# Patient Record
Sex: MALE | Race: ASIAN | NOT HISPANIC OR LATINO | URBAN - METROPOLITAN AREA
[De-identification: names, ages, dates, MRNs, and addresses within clinical notes are randomized per-mention and may not be internally consistent; named-entity substitution may affect disease eponyms.]

---

## 2017-11-25 ENCOUNTER — EMERGENCY (EMERGENCY)
Facility: HOSPITAL | Age: 27
LOS: 1 days | Discharge: HOME | End: 2017-11-25
Attending: EMERGENCY MEDICINE | Admitting: EMERGENCY MEDICINE
Payer: SELF-PAY

## 2017-11-25 VITALS
HEART RATE: 99 BPM | TEMPERATURE: 101 F | RESPIRATION RATE: 22 BRPM | OXYGEN SATURATION: 97 % | SYSTOLIC BLOOD PRESSURE: 99 MMHG | DIASTOLIC BLOOD PRESSURE: 65 MMHG

## 2017-11-25 VITALS
DIASTOLIC BLOOD PRESSURE: 67 MMHG | SYSTOLIC BLOOD PRESSURE: 114 MMHG | TEMPERATURE: 99 F | OXYGEN SATURATION: 99 % | RESPIRATION RATE: 18 BRPM | HEART RATE: 81 BPM

## 2017-11-25 LAB
ALBUMIN SERPL ELPH-MCNC: 4.5 G/DL — SIGNIFICANT CHANGE UP (ref 3.3–5)
ALP SERPL-CCNC: 93 U/L — SIGNIFICANT CHANGE UP (ref 40–120)
ALT FLD-CCNC: 30 U/L RC — SIGNIFICANT CHANGE UP (ref 10–45)
ANION GAP SERPL CALC-SCNC: 13 MMOL/L — SIGNIFICANT CHANGE UP (ref 5–17)
AST SERPL-CCNC: 26 U/L — SIGNIFICANT CHANGE UP (ref 10–40)
BASOPHILS # BLD AUTO: 0 K/UL — SIGNIFICANT CHANGE UP (ref 0–0.2)
BASOPHILS NFR BLD AUTO: 0.3 % — SIGNIFICANT CHANGE UP (ref 0–2)
BILIRUB SERPL-MCNC: 0.7 MG/DL — SIGNIFICANT CHANGE UP (ref 0.2–1.2)
BUN SERPL-MCNC: 9 MG/DL — SIGNIFICANT CHANGE UP (ref 7–23)
CALCIUM SERPL-MCNC: 9.4 MG/DL — SIGNIFICANT CHANGE UP (ref 8.4–10.5)
CHLORIDE SERPL-SCNC: 99 MMOL/L — SIGNIFICANT CHANGE UP (ref 96–108)
CO2 SERPL-SCNC: 26 MMOL/L — SIGNIFICANT CHANGE UP (ref 22–31)
CREAT SERPL-MCNC: 1.03 MG/DL — SIGNIFICANT CHANGE UP (ref 0.5–1.3)
EOSINOPHIL # BLD AUTO: 0 K/UL — SIGNIFICANT CHANGE UP (ref 0–0.5)
EOSINOPHIL NFR BLD AUTO: 0.4 % — SIGNIFICANT CHANGE UP (ref 0–6)
GLUCOSE SERPL-MCNC: 103 MG/DL — HIGH (ref 70–99)
HCT VFR BLD CALC: 48.3 % — SIGNIFICANT CHANGE UP (ref 39–50)
HETEROPH AB TITR SER AGGL: NEGATIVE — SIGNIFICANT CHANGE UP
HGB BLD-MCNC: 16.2 G/DL — SIGNIFICANT CHANGE UP (ref 13–17)
LACTATE BLDV-MCNC: 0.9 MMOL/L — SIGNIFICANT CHANGE UP (ref 0.7–2)
LYMPHOCYTES # BLD AUTO: 1.6 K/UL — SIGNIFICANT CHANGE UP (ref 1–3.3)
LYMPHOCYTES # BLD AUTO: 16.4 % — SIGNIFICANT CHANGE UP (ref 13–44)
MCHC RBC-ENTMCNC: 30.9 PG — SIGNIFICANT CHANGE UP (ref 27–34)
MCHC RBC-ENTMCNC: 33.6 GM/DL — SIGNIFICANT CHANGE UP (ref 32–36)
MCV RBC AUTO: 92 FL — SIGNIFICANT CHANGE UP (ref 80–100)
MONOCYTES # BLD AUTO: 1.1 K/UL — HIGH (ref 0–0.9)
MONOCYTES NFR BLD AUTO: 10.9 % — SIGNIFICANT CHANGE UP (ref 2–14)
NEUTROPHILS # BLD AUTO: 7 K/UL — SIGNIFICANT CHANGE UP (ref 1.8–7.4)
NEUTROPHILS NFR BLD AUTO: 72.2 % — SIGNIFICANT CHANGE UP (ref 43–77)
PLATELET # BLD AUTO: 164 K/UL — SIGNIFICANT CHANGE UP (ref 150–400)
POTASSIUM SERPL-MCNC: 3.9 MMOL/L — SIGNIFICANT CHANGE UP (ref 3.5–5.3)
POTASSIUM SERPL-SCNC: 3.9 MMOL/L — SIGNIFICANT CHANGE UP (ref 3.5–5.3)
PROT SERPL-MCNC: 8.4 G/DL — HIGH (ref 6–8.3)
RBC # BLD: 5.25 M/UL — SIGNIFICANT CHANGE UP (ref 4.2–5.8)
RBC # FLD: 11.3 % — SIGNIFICANT CHANGE UP (ref 10.3–14.5)
S PYO AG SPEC QL IA: NEGATIVE — SIGNIFICANT CHANGE UP
SODIUM SERPL-SCNC: 138 MMOL/L — SIGNIFICANT CHANGE UP (ref 135–145)
WBC # BLD: 9.8 K/UL — SIGNIFICANT CHANGE UP (ref 3.8–10.5)
WBC # FLD AUTO: 9.8 K/UL — SIGNIFICANT CHANGE UP (ref 3.8–10.5)

## 2017-11-25 PROCEDURE — 85027 COMPLETE CBC AUTOMATED: CPT

## 2017-11-25 PROCEDURE — 87880 STREP A ASSAY W/OPTIC: CPT

## 2017-11-25 PROCEDURE — 99284 EMERGENCY DEPT VISIT MOD MDM: CPT

## 2017-11-25 PROCEDURE — 87081 CULTURE SCREEN ONLY: CPT

## 2017-11-25 PROCEDURE — 80053 COMPREHEN METABOLIC PANEL: CPT

## 2017-11-25 PROCEDURE — 86308 HETEROPHILE ANTIBODY SCREEN: CPT

## 2017-11-25 PROCEDURE — 83605 ASSAY OF LACTIC ACID: CPT

## 2017-11-25 RX ORDER — ACETAMINOPHEN 500 MG
975 TABLET ORAL ONCE
Qty: 0 | Refills: 0 | Status: COMPLETED | OUTPATIENT
Start: 2017-11-25 | End: 2017-11-25

## 2017-11-25 RX ORDER — ACETAMINOPHEN 500 MG
1000 TABLET ORAL ONCE
Qty: 0 | Refills: 0 | Status: DISCONTINUED | OUTPATIENT
Start: 2017-11-25 | End: 2017-11-25

## 2017-11-25 RX ORDER — IBUPROFEN 200 MG
600 TABLET ORAL ONCE
Qty: 0 | Refills: 0 | Status: COMPLETED | OUTPATIENT
Start: 2017-11-25 | End: 2017-11-25

## 2017-11-25 RX ORDER — SODIUM CHLORIDE 9 MG/ML
1000 INJECTION INTRAMUSCULAR; INTRAVENOUS; SUBCUTANEOUS ONCE
Qty: 0 | Refills: 0 | Status: COMPLETED | OUTPATIENT
Start: 2017-11-25 | End: 2017-11-25

## 2017-11-25 RX ADMIN — Medication 600 MILLIGRAM(S): at 14:07

## 2017-11-25 RX ADMIN — Medication 975 MILLIGRAM(S): at 12:03

## 2017-11-25 RX ADMIN — SODIUM CHLORIDE 2000 MILLILITER(S): 9 INJECTION INTRAMUSCULAR; INTRAVENOUS; SUBCUTANEOUS at 11:59

## 2017-11-25 RX ADMIN — Medication 600 MILLIGRAM(S): at 12:03

## 2017-11-25 NOTE — ED PROVIDER NOTE - PLAN OF CARE
1. Follow up with your PMD within 48-72 hours.  Or call medicine clinic [phone] to make an appointment. You will get a call for any abnormal results   2. Rest, increase fluids. Take tylenol 1g every 6 hours for pain or fevers alternated with Motrin 600mg every 6 hours. Keep self hydration.   3. Worsening or continued fever, chills, weakness, nausea, vomiting, abdominal pain return to ER

## 2017-11-25 NOTE — ED PROVIDER NOTE - ATTENDING CONTRIBUTION TO CARE
ATTENDING MD: Baltazar ACKERMAN, have seen and evaluated this patient with the advance practice clinician.  I have discussed the history, exam ,and aspects of care with the APC.  I have reviewed the APC's note. I agree with the findings  unless other wise stated in the HPI, PE, MDM, and progress notes.     VITALS: reviewed and notable for mild fever and tachycardia that improve diwth fluid and antipyretics  GEN: NAD, A & O x 4  HEAD/EYES: NCAT, PERRL, EOMI, anicteric sclerae, no conjunctival pallor  ENT: mucus membranes moist, oropharynx erythemetous with mild tonsilar swelling and filmy tonxillar exudate, no dysphonia, no drooling, no t maynor tenderness, neck supple, mildly tender anterior cervical lymphadenopathy, no posterior LA, trachea midline, no JVD  CHEST: lungs CTA with equal breath sounds bilaterally, chest wall nontender and atraumatic  CV: heart with reg rhythm S1, S2, no murmur; distal pulses intact and symmetric bilaterally  ABDOMEN: normoactive bowel sounds, soft, nondistended, nontender, no masses, no hepatomegaly, no splenomegaly  : no CVAT  SKIN: warm, dry, no rash, no bruising, no cyanosis. color appropriate for ethnicity  NEURO: alert, mentating appropriately, no facial asymmetry. gross sensation, motor, coordination are intact, no meningismus  PSYCH: Affect appropriate     MDM: well appearing male with exudative pharyngitis. will test for strep, mono, GC. pt improved symptomatically, nontoxic, is not septic. Will defer antibiotics to positive test, otherwise will provide supportive care for viral cause.

## 2017-11-25 NOTE — ED ADULT NURSE NOTE - OBJECTIVE STATEMENT
26 yo male A&OX3 presents to the ED with the c/o fevers and chills. Pt states that symptoms started on Monday and has been getting worst. States that he has been taking Advil at home. Pt reports that he is having body aches, no burning on urination, + non productive cough. Lungs clear, equal b/l, no. + headache and dizziness. Pt currently tachycardic. temp of 100.6. MAEX4

## 2017-11-25 NOTE — ED PROVIDER NOTE - PROGRESS NOTE DETAILS
Labs wnl, rapid strep negative. Discussed testing for throat gc/chlamydia with patient who states he would like to be tested.  Also discussed prophylactic treatment today in ED vs waiting for culture results. patient states that he would like to wait until culture results return, provided cell phone number during registration. Patient states he is over all feeling better  Arlene Aleman PA-C Labs wnl, rapid strep negative. Discussed testing for throat gc/chlamydia with patient who states he would like to be tested.  Also discussed prophylactic treatment today in ED vs waiting for culture results. patient states that he would like to wait until culture/GC results return, provided cell phone number during registration. Patient states he is over all feeling better  Arlene Aleman PA-C vitals improved. I have advised the patient on the usual course of pharyngitis, an appropriate schedule for follow-up, and concerning signs and symptoms that should prompt return to the emergency department. I answered all questions to the best of my ability. The patient is stable for discharge.

## 2017-11-25 NOTE — ED PROVIDER NOTE - CARE PLAN
Principal Discharge DX:	Sore throat  Instructions for follow-up, activity and diet:	1. Follow up with your PMD within 48-72 hours.  Or call medicine clinic [phone] to make an appointment. You will get a call for any abnormal results   2. Rest, increase fluids. Take tylenol 1g every 6 hours for pain or fevers alternated with Motrin 600mg every 6 hours. Keep self hydration.   3. Worsening or continued fever, chills, weakness, nausea, vomiting, abdominal pain return to ER Principal Discharge DX:	Exudative pharyngitis  Instructions for follow-up, activity and diet:	1. Follow up with your PMD within 48-72 hours.  Or call medicine clinic [phone] to make an appointment. You will get a call for any abnormal results   2. Rest, increase fluids. Take tylenol 1g every 6 hours for pain or fevers alternated with Motrin 600mg every 6 hours. Keep self hydration.   3. Worsening or continued fever, chills, weakness, nausea, vomiting, abdominal pain return to ER

## 2017-11-25 NOTE — ED PROVIDER NOTE - OBJECTIVE STATEMENT
27 year old male presents complaining of fevers, sore throat, myalgias and fatigue for 5 days. Patient describes onset of symptoms which have been persistent for the pas 5 days. Patient has been taking advil at home last dose last night. He has maintained good PO intake. States he noticed white spots on his thorat yesterday. Denies sick contacts, abdominal pain, n/v, urinary issues, sick contacts 27 year old male presents complaining of fevers, sore throat, myalgias and fatigue for 5 days. Patient describes onset of symptoms which have been persistent for the pas 5 days. Patient has been taking advil at home last dose last night. He has maintained good PO intake. States he noticed white spots on his thorat yesterday. No cough, SOB, n/v/d, abdominal pain, back pain, urinary symptoms or discharge. Denies sick contacts, abdominal pain, n/v, urinary issues, sick contacts. Of note pt is bisexual and does not always use barrier protection, +receptive oral sex with men, though no known personal hx of STI or hx with his partner.

## 2017-11-25 NOTE — ED ADULT NURSE NOTE - CHIEF COMPLAINT QUOTE
fever, chills, headache, sore throat, bodyaches x 5days fever, chills, headache, sore throat, body aches x 5days

## 2017-11-27 LAB
C TRACH RRNA SPEC QL NAA+PROBE: SIGNIFICANT CHANGE UP
N GONORRHOEA RRNA SPEC QL NAA+PROBE: SIGNIFICANT CHANGE UP
SPECIMEN SOURCE: SIGNIFICANT CHANGE UP

## 2018-04-22 ENCOUNTER — EMERGENCY (EMERGENCY)
Facility: HOSPITAL | Age: 28
LOS: 1 days | Discharge: HOME | End: 2018-04-22
Attending: EMERGENCY MEDICINE
Payer: SELF-PAY

## 2018-04-22 VITALS — OXYGEN SATURATION: 99 % | HEART RATE: 88 BPM | RESPIRATION RATE: 17 BRPM

## 2018-04-22 VITALS
SYSTOLIC BLOOD PRESSURE: 106 MMHG | OXYGEN SATURATION: 97 % | HEART RATE: 78 BPM | TEMPERATURE: 103 F | RESPIRATION RATE: 16 BRPM | DIASTOLIC BLOOD PRESSURE: 58 MMHG | WEIGHT: 145.95 LBS | HEIGHT: 65 IN

## 2018-04-22 PROCEDURE — 99283 EMERGENCY DEPT VISIT LOW MDM: CPT

## 2018-04-22 RX ORDER — DEXAMETHASONE 0.5 MG/5ML
10 ELIXIR ORAL ONCE
Qty: 0 | Refills: 0 | Status: COMPLETED | OUTPATIENT
Start: 2018-04-22 | End: 2018-04-22

## 2018-04-22 RX ORDER — DEXAMETHASONE 0.5 MG/5ML
10 ELIXIR ORAL ONCE
Qty: 0 | Refills: 0 | Status: DISCONTINUED | OUTPATIENT
Start: 2018-04-22 | End: 2018-04-22

## 2018-04-22 RX ORDER — IBUPROFEN 200 MG
400 TABLET ORAL ONCE
Qty: 0 | Refills: 0 | Status: COMPLETED | OUTPATIENT
Start: 2018-04-22 | End: 2018-04-22

## 2018-04-22 RX ADMIN — Medication 10 MILLIGRAM(S): at 11:11

## 2018-04-22 RX ADMIN — Medication 400 MILLIGRAM(S): at 11:10

## 2018-04-22 NOTE — ED PROVIDER NOTE - MEDICAL DECISION MAKING DETAILS
28m here c/o 2 days throat pain and fever w/o cough. 3/4 Centor criteria so strep pharyngitis likely but pt 28 and would unlikely benefit from abx treatment so will not swab. Also technically meets sepsis criteria given fever and tachycardia but given otherwise healthy pt w likely benign etiology will not pursue sepsis pathway. Antipyretic, steroids, reassess vitals likely dc

## 2018-04-22 NOTE — ED PROVIDER NOTE - OBJECTIVE STATEMENT
28m no PMH here c/o 2 days fever (Tm at home 102) and sore throat. No cough, no rhinorrhea, no ocular sx. No cp or SOB, no n/v/d. No sick contacts. Has taken tylenol at home as recently as 11 hours ago w no relief.

## 2018-04-22 NOTE — ED ADULT NURSE REASSESSMENT NOTE - NS ED NURSE REASSESS COMMENT FT1
Pt AAOx4, NAD, resting comfortably in bed. Pt denies headache, dizziness, chest pain, cough, SOB, abdominal pain, n/v/d, urinary symptoms, fevers, chills, weakness at this time. Pt discharged as per MD, pt ambulated independently out of ED.

## 2018-04-22 NOTE — ED PROVIDER NOTE - ATTENDING CONTRIBUTION TO CARE
Attending Note (Aleshia): patient complaining of sore throat.  erythema in throat.  likely viral pharyngitis.  symptomatic treatment and dc.

## 2018-04-22 NOTE — ED ADULT NURSE NOTE - OBJECTIVE STATEMENT
Pt is a 27 yo male with the co fevers as high as 102 at home and a sore throat since yesterday. Pt reports taking Tylenol this am at 12 but denies it helping with his fever. + N no V/D no cough, no CP or SOB, no abdominal tenderness or diff urinating. Pt denies any pmh

## 2018-08-19 ENCOUNTER — EMERGENCY (EMERGENCY)
Facility: HOSPITAL | Age: 28
LOS: 1 days | Discharge: HOME | End: 2018-08-19
Attending: EMERGENCY MEDICINE
Payer: SELF-PAY

## 2018-08-19 VITALS
OXYGEN SATURATION: 98 % | HEART RATE: 78 BPM | RESPIRATION RATE: 18 BRPM | TEMPERATURE: 98 F | SYSTOLIC BLOOD PRESSURE: 115 MMHG | DIASTOLIC BLOOD PRESSURE: 73 MMHG

## 2018-08-19 PROCEDURE — 99284 EMERGENCY DEPT VISIT MOD MDM: CPT | Mod: 25

## 2018-08-19 PROCEDURE — 99053 MED SERV 10PM-8AM 24 HR FAC: CPT

## 2018-08-19 RX ORDER — EMTRICITABINE AND TENOFOVIR DISOPROXIL FUMARATE 200; 300 MG/1; MG/1
1 TABLET, FILM COATED ORAL DAILY
Qty: 0 | Refills: 0 | Status: DISCONTINUED | OUTPATIENT
Start: 2018-08-19 | End: 2018-08-23

## 2018-08-19 RX ORDER — DOLUTEGRAVIR SODIUM 25 MG/1
50 TABLET, FILM COATED ORAL DAILY
Qty: 0 | Refills: 0 | Status: DISCONTINUED | OUTPATIENT
Start: 2018-08-19 | End: 2018-08-20

## 2018-08-19 NOTE — ED PROVIDER NOTE - OBJECTIVE STATEMENT
28M w/ no PMH reports having unprotected sex this morning with a male, insertive and receptive. States he has no way of contacting the partner. Worried about HIV exposure, requests PEP.

## 2018-08-19 NOTE — ED PROVIDER NOTE - PLAN OF CARE
You were seen for post exposure prophylaxis. Please take Truvada once a day for 28 days at night. Please take isentress 1 tab in the morning and 1 tab at night for 28 days. Also follow up with ID clinic for further lab tests. post exposure prophylaxis

## 2018-08-19 NOTE — ED PROVIDER NOTE - NS ED ROS FT
General: denies fever, chills, weight loss/weight gain.  HENT: denies nasal congestion, sore throat, rhinorrhea, ear pain  CV: denies chest pain, palpitations  Resp: denies difficulty breathing, cough  Abdominal: denies nausea, vomiting, diarrhea, abdominal pain, blood in stool, dark stool  : denies discharge, denies dysuria  MSK: denies muscle aches, bony pain, leg pain, leg swelling  Neuro: denies headaches, numbness, tingling, dizziness, lightheadedness.

## 2018-08-19 NOTE — ED PROVIDER NOTE - MEDICAL DECISION MAKING DETAILS
28M w/ no PMH w/ possible HIV exposure. PEP medications and HIV + GC/ chlamydia testing. ID follow up

## 2018-08-19 NOTE — ED PROVIDER NOTE - CARE PLAN
Assessment and plan of treatment:	You were seen for post exposure prophylaxis. Please take Truvada once a day for 28 days at night. Please take isentress 1 tab in the morning and 1 tab at night for 28 days. Also follow up with ID clinic for further lab tests. Principal Discharge DX:	Unprotected sex  Goal:	post exposure prophylaxis  Assessment and plan of treatment:	You were seen for post exposure prophylaxis. Please take Truvada once a day for 28 days at night. Please take isentress 1 tab in the morning and 1 tab at night for 28 days. Also follow up with ID clinic for further lab tests.

## 2018-08-19 NOTE — ED PROVIDER NOTE - ATTENDING CONTRIBUTION TO CARE
28y M no PMH here for post exposure prophylaxis after consensual, unprotected male on male intercourse this AM. Does not known partner's HIV status. No other complaints. Would like STD testing and PEP.   Gen: WNWD NAD  HEENT: NCAT normal pharynx  Neck: supple  CV: RRR, no murmur  Lung: CTA BL  Abd: +BS soft NTND  Neuro: A&Ox3, CN grossly intact  AP: 28y M no PMH here for post exposure prophylaxis after consensual, unprotected male on male intercourse. Check baseline labs, GC/chlamydia, HIV, DC with PEP. Will give ID clinic FU. Aware will need repeat HIV testing.

## 2018-08-20 LAB
ALBUMIN SERPL ELPH-MCNC: 4.4 G/DL — SIGNIFICANT CHANGE UP (ref 3.3–5)
ALP SERPL-CCNC: 62 U/L — SIGNIFICANT CHANGE UP (ref 40–120)
ALT FLD-CCNC: 15 U/L — SIGNIFICANT CHANGE UP (ref 10–45)
ANION GAP SERPL CALC-SCNC: 8 MMOL/L — SIGNIFICANT CHANGE UP (ref 5–17)
AST SERPL-CCNC: 15 U/L — SIGNIFICANT CHANGE UP (ref 10–40)
BASOPHILS # BLD AUTO: 0 K/UL — SIGNIFICANT CHANGE UP (ref 0–0.2)
BASOPHILS NFR BLD AUTO: 0.5 % — SIGNIFICANT CHANGE UP (ref 0–2)
BILIRUB SERPL-MCNC: 0.3 MG/DL — SIGNIFICANT CHANGE UP (ref 0.2–1.2)
BUN SERPL-MCNC: 15 MG/DL — SIGNIFICANT CHANGE UP (ref 7–23)
C TRACH RRNA SPEC QL NAA+PROBE: SIGNIFICANT CHANGE UP
CALCIUM SERPL-MCNC: 9.2 MG/DL — SIGNIFICANT CHANGE UP (ref 8.4–10.5)
CHLORIDE SERPL-SCNC: 104 MMOL/L — SIGNIFICANT CHANGE UP (ref 96–108)
CO2 SERPL-SCNC: 26 MMOL/L — SIGNIFICANT CHANGE UP (ref 22–31)
CREAT SERPL-MCNC: 1.28 MG/DL — SIGNIFICANT CHANGE UP (ref 0.5–1.3)
EOSINOPHIL # BLD AUTO: 0.2 K/UL — SIGNIFICANT CHANGE UP (ref 0–0.5)
EOSINOPHIL NFR BLD AUTO: 3.1 % — SIGNIFICANT CHANGE UP (ref 0–6)
GLUCOSE SERPL-MCNC: 97 MG/DL — SIGNIFICANT CHANGE UP (ref 70–99)
HCT VFR BLD CALC: 48.3 % — SIGNIFICANT CHANGE UP (ref 39–50)
HGB BLD-MCNC: 16.2 G/DL — SIGNIFICANT CHANGE UP (ref 13–17)
HIV 1 & 2 AB SERPL IA.RAPID: SIGNIFICANT CHANGE UP
LYMPHOCYTES # BLD AUTO: 2.6 K/UL — SIGNIFICANT CHANGE UP (ref 1–3.3)
LYMPHOCYTES # BLD AUTO: 41.7 % — SIGNIFICANT CHANGE UP (ref 13–44)
MCHC RBC-ENTMCNC: 31.5 PG — SIGNIFICANT CHANGE UP (ref 27–34)
MCHC RBC-ENTMCNC: 33.6 GM/DL — SIGNIFICANT CHANGE UP (ref 32–36)
MCV RBC AUTO: 93.8 FL — SIGNIFICANT CHANGE UP (ref 80–100)
MONOCYTES # BLD AUTO: 0.6 K/UL — SIGNIFICANT CHANGE UP (ref 0–0.9)
MONOCYTES NFR BLD AUTO: 9.7 % — SIGNIFICANT CHANGE UP (ref 2–14)
N GONORRHOEA RRNA SPEC QL NAA+PROBE: SIGNIFICANT CHANGE UP
NEUTROPHILS # BLD AUTO: 2.8 K/UL — SIGNIFICANT CHANGE UP (ref 1.8–7.4)
NEUTROPHILS NFR BLD AUTO: 45 % — SIGNIFICANT CHANGE UP (ref 43–77)
PLATELET # BLD AUTO: 153 K/UL — SIGNIFICANT CHANGE UP (ref 150–400)
POTASSIUM SERPL-MCNC: 4 MMOL/L — SIGNIFICANT CHANGE UP (ref 3.5–5.3)
POTASSIUM SERPL-SCNC: 4 MMOL/L — SIGNIFICANT CHANGE UP (ref 3.5–5.3)
PROT SERPL-MCNC: 7.2 G/DL — SIGNIFICANT CHANGE UP (ref 6–8.3)
RBC # BLD: 5.15 M/UL — SIGNIFICANT CHANGE UP (ref 4.2–5.8)
RBC # FLD: 12.5 % — SIGNIFICANT CHANGE UP (ref 10.3–14.5)
SODIUM SERPL-SCNC: 138 MMOL/L — SIGNIFICANT CHANGE UP (ref 135–145)
SPECIMEN SOURCE: SIGNIFICANT CHANGE UP
WBC # BLD: 6.3 K/UL — SIGNIFICANT CHANGE UP (ref 3.8–10.5)
WBC # FLD AUTO: 6.3 K/UL — SIGNIFICANT CHANGE UP (ref 3.8–10.5)

## 2018-08-20 PROCEDURE — 87591 N.GONORRHOEAE DNA AMP PROB: CPT

## 2018-08-20 PROCEDURE — 80053 COMPREHEN METABOLIC PANEL: CPT

## 2018-08-20 PROCEDURE — 85027 COMPLETE CBC AUTOMATED: CPT

## 2018-08-20 PROCEDURE — 86703 HIV-1/HIV-2 1 RESULT ANTBDY: CPT

## 2018-08-20 PROCEDURE — 87491 CHLMYD TRACH DNA AMP PROBE: CPT

## 2018-08-20 PROCEDURE — 99283 EMERGENCY DEPT VISIT LOW MDM: CPT

## 2018-08-20 RX ORDER — RALTEGRAVIR 400 MG/1
400 TABLET, FILM COATED ORAL ONCE
Qty: 0 | Refills: 0 | Status: COMPLETED | OUTPATIENT
Start: 2018-08-20 | End: 2018-08-20

## 2018-08-20 RX ORDER — RALTEGRAVIR 400 MG/1
1 TABLET, FILM COATED ORAL
Qty: 60 | Refills: 0 | OUTPATIENT
Start: 2018-08-20 | End: 2018-09-18

## 2018-08-20 RX ORDER — EMTRICITABINE AND TENOFOVIR DISOPROXIL FUMARATE 200; 300 MG/1; MG/1
1 TABLET, FILM COATED ORAL
Qty: 30 | Refills: 0 | OUTPATIENT
Start: 2018-08-20 | End: 2018-09-18

## 2018-08-20 RX ORDER — ONDANSETRON 8 MG/1
1 TABLET, FILM COATED ORAL
Qty: 30 | Refills: 0 | OUTPATIENT
Start: 2018-08-20 | End: 2018-09-18

## 2018-08-20 RX ADMIN — EMTRICITABINE AND TENOFOVIR DISOPROXIL FUMARATE 1 TABLET(S): 200; 300 TABLET, FILM COATED ORAL at 00:35

## 2018-08-20 RX ADMIN — RALTEGRAVIR 400 MILLIGRAM(S): 400 TABLET, FILM COATED ORAL at 00:35

## 2018-08-20 NOTE — ED ADULT NURSE NOTE - OBJECTIVE STATEMENT
Pt presents to ED awake, alert and ambulatory, requesting STD testing and prophylaxis. Pt states he had sexual intercourse with a male partner this morning and is concerned about HIV. Pt denies symptoms or medical history. Pt in NAD. Respirations even and unlabored.

## 2018-10-07 ENCOUNTER — EMERGENCY (EMERGENCY)
Facility: HOSPITAL | Age: 28
LOS: 1 days | Discharge: HOME | End: 2018-10-07
Attending: EMERGENCY MEDICINE
Payer: SELF-PAY

## 2018-10-07 VITALS
TEMPERATURE: 100 F | SYSTOLIC BLOOD PRESSURE: 104 MMHG | RESPIRATION RATE: 18 BRPM | DIASTOLIC BLOOD PRESSURE: 69 MMHG | OXYGEN SATURATION: 98 % | HEART RATE: 100 BPM

## 2018-10-07 VITALS
TEMPERATURE: 99 F | OXYGEN SATURATION: 96 % | RESPIRATION RATE: 16 BRPM | SYSTOLIC BLOOD PRESSURE: 107 MMHG | HEIGHT: 68 IN | HEART RATE: 114 BPM | WEIGHT: 145.06 LBS | DIASTOLIC BLOOD PRESSURE: 70 MMHG

## 2018-10-07 LAB
ALBUMIN SERPL ELPH-MCNC: 4.7 G/DL — SIGNIFICANT CHANGE UP (ref 3.3–5)
ALP SERPL-CCNC: 80 U/L — SIGNIFICANT CHANGE UP (ref 40–120)
ALT FLD-CCNC: 22 U/L — SIGNIFICANT CHANGE UP (ref 10–45)
ANION GAP SERPL CALC-SCNC: 13 MMOL/L — SIGNIFICANT CHANGE UP (ref 5–17)
AST SERPL-CCNC: 39 U/L — SIGNIFICANT CHANGE UP (ref 10–40)
BASOPHILS # BLD AUTO: 0 K/UL — SIGNIFICANT CHANGE UP (ref 0–0.2)
BASOPHILS NFR BLD AUTO: 0.1 % — SIGNIFICANT CHANGE UP (ref 0–2)
BILIRUB SERPL-MCNC: 0.8 MG/DL — SIGNIFICANT CHANGE UP (ref 0.2–1.2)
BUN SERPL-MCNC: 11 MG/DL — SIGNIFICANT CHANGE UP (ref 7–23)
CALCIUM SERPL-MCNC: 9.7 MG/DL — SIGNIFICANT CHANGE UP (ref 8.4–10.5)
CHLORIDE SERPL-SCNC: 99 MMOL/L — SIGNIFICANT CHANGE UP (ref 96–108)
CO2 SERPL-SCNC: 26 MMOL/L — SIGNIFICANT CHANGE UP (ref 22–31)
CREAT SERPL-MCNC: 0.96 MG/DL — SIGNIFICANT CHANGE UP (ref 0.5–1.3)
EOSINOPHIL # BLD AUTO: 0 K/UL — SIGNIFICANT CHANGE UP (ref 0–0.5)
EOSINOPHIL NFR BLD AUTO: 0.2 % — SIGNIFICANT CHANGE UP (ref 0–6)
GLUCOSE SERPL-MCNC: 90 MG/DL — SIGNIFICANT CHANGE UP (ref 70–99)
HCT VFR BLD CALC: 47.4 % — SIGNIFICANT CHANGE UP (ref 39–50)
HGB BLD-MCNC: 16.1 G/DL — SIGNIFICANT CHANGE UP (ref 13–17)
HIV 1 & 2 AB SERPL IA.RAPID: SIGNIFICANT CHANGE UP
LYMPHOCYTES # BLD AUTO: 1.4 K/UL — SIGNIFICANT CHANGE UP (ref 1–3.3)
LYMPHOCYTES # BLD AUTO: 7.5 % — LOW (ref 13–44)
MCHC RBC-ENTMCNC: 31.1 PG — SIGNIFICANT CHANGE UP (ref 27–34)
MCHC RBC-ENTMCNC: 34.1 GM/DL — SIGNIFICANT CHANGE UP (ref 32–36)
MCV RBC AUTO: 91.1 FL — SIGNIFICANT CHANGE UP (ref 80–100)
MONOCYTES # BLD AUTO: 1.8 K/UL — HIGH (ref 0–0.9)
MONOCYTES NFR BLD AUTO: 9.2 % — SIGNIFICANT CHANGE UP (ref 2–14)
NEUTROPHILS # BLD AUTO: 15.7 K/UL — HIGH (ref 1.8–7.4)
NEUTROPHILS NFR BLD AUTO: 82.9 % — HIGH (ref 43–77)
PLATELET # BLD AUTO: 179 K/UL — SIGNIFICANT CHANGE UP (ref 150–400)
POTASSIUM SERPL-MCNC: 4.3 MMOL/L — SIGNIFICANT CHANGE UP (ref 3.5–5.3)
POTASSIUM SERPL-SCNC: 4.3 MMOL/L — SIGNIFICANT CHANGE UP (ref 3.5–5.3)
PROT SERPL-MCNC: 8.1 G/DL — SIGNIFICANT CHANGE UP (ref 6–8.3)
RAPID RVP RESULT: DETECTED
RBC # BLD: 5.2 M/UL — SIGNIFICANT CHANGE UP (ref 4.2–5.8)
RBC # FLD: 11.8 % — SIGNIFICANT CHANGE UP (ref 10.3–14.5)
RV+EV RNA SPEC QL NAA+PROBE: DETECTED
SODIUM SERPL-SCNC: 138 MMOL/L — SIGNIFICANT CHANGE UP (ref 135–145)
WBC # BLD: 19 K/UL — HIGH (ref 3.8–10.5)
WBC # FLD AUTO: 19 K/UL — HIGH (ref 3.8–10.5)

## 2018-10-07 PROCEDURE — 87581 M.PNEUMON DNA AMP PROBE: CPT

## 2018-10-07 PROCEDURE — 87486 CHLMYD PNEUM DNA AMP PROBE: CPT

## 2018-10-07 PROCEDURE — 87798 DETECT AGENT NOS DNA AMP: CPT

## 2018-10-07 PROCEDURE — 99284 EMERGENCY DEPT VISIT MOD MDM: CPT | Mod: 25

## 2018-10-07 PROCEDURE — 71046 X-RAY EXAM CHEST 2 VIEWS: CPT | Mod: 26

## 2018-10-07 PROCEDURE — 87633 RESP VIRUS 12-25 TARGETS: CPT

## 2018-10-07 PROCEDURE — 71046 X-RAY EXAM CHEST 2 VIEWS: CPT

## 2018-10-07 PROCEDURE — 86703 HIV-1/HIV-2 1 RESULT ANTBDY: CPT

## 2018-10-07 PROCEDURE — 96374 THER/PROPH/DIAG INJ IV PUSH: CPT

## 2018-10-07 PROCEDURE — 85027 COMPLETE CBC AUTOMATED: CPT

## 2018-10-07 PROCEDURE — 80053 COMPREHEN METABOLIC PANEL: CPT

## 2018-10-07 PROCEDURE — 99284 EMERGENCY DEPT VISIT MOD MDM: CPT

## 2018-10-07 RX ORDER — KETOROLAC TROMETHAMINE 30 MG/ML
15 SYRINGE (ML) INJECTION ONCE
Qty: 0 | Refills: 0 | Status: DISCONTINUED | OUTPATIENT
Start: 2018-10-07 | End: 2018-10-07

## 2018-10-07 RX ORDER — SODIUM CHLORIDE 9 MG/ML
1000 INJECTION, SOLUTION INTRAVENOUS ONCE
Qty: 0 | Refills: 0 | Status: COMPLETED | OUTPATIENT
Start: 2018-10-07 | End: 2018-10-07

## 2018-10-07 RX ORDER — SODIUM CHLORIDE 9 MG/ML
1000 INJECTION INTRAMUSCULAR; INTRAVENOUS; SUBCUTANEOUS ONCE
Qty: 0 | Refills: 0 | Status: DISCONTINUED | OUTPATIENT
Start: 2018-10-07 | End: 2018-10-07

## 2018-10-07 RX ADMIN — Medication 15 MILLIGRAM(S): at 21:01

## 2018-10-07 RX ADMIN — SODIUM CHLORIDE 4000 MILLILITER(S): 9 INJECTION, SOLUTION INTRAVENOUS at 21:00

## 2018-10-07 NOTE — ED PROVIDER NOTE - PHYSICAL EXAMINATION
Attending MD Miner: A & O x 3, NAD, EOMI b/l, PERRL b/l; lungs CTAB, heart with reg rhythm without murmur; abdomen soft NTND; extremities with no edema; affect appropriate. neuro exam non focal with no motor or sensory deficits. neck supple Attending MD Miner: A & O x 3, NAD, EOMI b/l, PERRL b/l; lungs CTAB, heart with reg rhythm without murmur; abdomen soft NTND; extremities with no edema; affect appropriate. neuro exam non focal with no motor or sensory deficits. neck supple    Gen: AAOx3, non-toxic  Head: NCAT  HEENT: EOMI, oral mucosa moist, normal conjunctiva  Lung: CTAB, no respiratory distress, no wheezes/rhonchi/rales B/L, speaking in full sentences  CV: RRR, no murmurs, rubs or gallops  Abd: soft, NTND, no guarding, no CVA tenderness  MSK: no visible deformities  Neuro: No focal sensory or motor deficits  Skin: Warm, well perfused, no rash  Psych: normal affect.   ~Michael Gift PGY1

## 2018-10-07 NOTE — ED PROVIDER NOTE - MEDICAL DECISION MAKING DETAILS
Attending MD Miner: 28M with headache, fatigue and cough, neck supple, well appearing. Likely viral URI, pt exposed to HIV 1 month prior sp post-exposure ppx, will recheck HIV to ensure no seroconversion, re-eval

## 2018-10-07 NOTE — ED ADULT NURSE NOTE - OBJECTIVE STATEMENT
27 y/o male presents to ED c/o generalized headache, chills, nausea with 1 episode of vomiting. Pt reports 2 weeks of diarrhea, chronic lower back pain with new symptoms in last few days. Denies chest pain, cough, urinary symptoms. Pt says he has been feeling stressed and anxious in last month after his mother . Lungs clear b/l. Skin warm, dry, intact. Gross motor and neuro intact. 20G IV placed in RAC. Safety and comfort provided.

## 2018-10-07 NOTE — ED PROVIDER NOTE - ATTENDING CONTRIBUTION TO CARE
Attending MD Miner:  I personally have seen and examined this patient.  Resident note reviewed and agree on plan of care and except where noted.  See HPI, PE, and MDM for details.

## 2018-10-07 NOTE — ED PROVIDER NOTE - CARE PLAN
Principal Discharge DX:	URI (upper respiratory infection)  Assessment and plan of treatment:	You were seen in the ED for a cough and headache. Your blood work showed you have enterovirus, the common cold.     You may use Tylenol 650mg every 8 hours or Motrin 600mg every 8 hours as needed for pain. Drink plenty of fluids.     Return for worsening headache or any other concerns.

## 2018-10-07 NOTE — ED PROVIDER NOTE - OBJECTIVE STATEMENT
Pt with no pertinent pmh present with multiple complaints. Pt reports that 8 hrs ago will at work he began to have chills, global throbbing headache lightheadedness and numbness in his hands b/l. Pt also reports N&Vx1 and non bloody diarrhea for 2x weeks. Pt with no pertinent pmh present with multiple complaints. Pt reports that 8 hrs ago will at work he began to have chills, global throbbing headache lightheadedness and numbness in his hands b/l. Pt also reports N&Vx1 and non bloody diarrhea for 2x weeks. Pt was exposed to HIV 1 month ago through unprotected sex with same sex. Pt report that he took HIV but has not followed up for a repeat HIV test. Pt state that he has been eating and drinking well and this has never happen to him before. Denies, cp, sob, abdominal pain, blood in stool or urine, constipation double vision

## 2018-10-07 NOTE — ED PROVIDER NOTE - NS ED ROS FT
GENERAL: No fever but has cchills, EYES: no change in vision, HEENT: no trouble swallowing or speaking, CARDIAC: no chest pain, PULMONARY: no cough or SOB, GI: see hpi no abdominal pain, or constipation, : No changes in urination, SKIN: no rashes, NEURO:see hpi,  MSK: No joint pain ~Aluko gift PGY1

## 2018-10-07 NOTE — ED ADULT NURSE NOTE - NSIMPLEMENTINTERV_GEN_ALL_ED
Implemented All Universal Safety Interventions:  [place] to call system. Call bell, personal items and telephone within reach. Instruct patient to call for assistance. Room bathroom lighting operational. Non-slip footwear when patient is off stretcher. Physically safe environment: no spills, clutter or unnecessary equipment. Stretcher in lowest position, wheels locked, appropriate side rails in place.

## 2018-10-18 NOTE — ED ADULT TRIAGE NOTE - NS ED NURSE BANDS TYPE
How Many Skin Cancers Have You Had?: more than one
What Is The Reason For Today's Visit?: History of Non-Melanoma Skin Cancer
Name band;

## 2019-02-19 ENCOUNTER — EMERGENCY (EMERGENCY)
Facility: HOSPITAL | Age: 29
LOS: 1 days | Discharge: HOME | End: 2019-02-19
Attending: EMERGENCY MEDICINE
Payer: SELF-PAY

## 2019-02-19 VITALS
DIASTOLIC BLOOD PRESSURE: 88 MMHG | WEIGHT: 147.05 LBS | SYSTOLIC BLOOD PRESSURE: 138 MMHG | HEIGHT: 68 IN | OXYGEN SATURATION: 97 % | RESPIRATION RATE: 20 BRPM | TEMPERATURE: 101 F | HEART RATE: 130 BPM

## 2019-02-19 PROCEDURE — 99284 EMERGENCY DEPT VISIT MOD MDM: CPT | Mod: 25

## 2019-02-19 PROCEDURE — 93010 ELECTROCARDIOGRAM REPORT: CPT

## 2019-02-19 PROCEDURE — 99053 MED SERV 10PM-8AM 24 HR FAC: CPT

## 2019-02-19 RX ORDER — SODIUM CHLORIDE 9 MG/ML
2100 INJECTION, SOLUTION INTRAVENOUS ONCE
Qty: 0 | Refills: 0 | Status: COMPLETED | OUTPATIENT
Start: 2019-02-19 | End: 2019-02-19

## 2019-02-19 RX ORDER — KETOROLAC TROMETHAMINE 30 MG/ML
15 SYRINGE (ML) INJECTION ONCE
Qty: 0 | Refills: 0 | Status: DISCONTINUED | OUTPATIENT
Start: 2019-02-19 | End: 2019-02-19

## 2019-02-19 RX ORDER — DEXAMETHASONE 0.5 MG/5ML
6 ELIXIR ORAL ONCE
Qty: 0 | Refills: 0 | Status: DISCONTINUED | OUTPATIENT
Start: 2019-02-19 | End: 2019-02-20

## 2019-02-19 RX ORDER — ACETAMINOPHEN 500 MG
975 TABLET ORAL ONCE
Qty: 0 | Refills: 0 | Status: COMPLETED | OUTPATIENT
Start: 2019-02-19 | End: 2019-02-19

## 2019-02-19 NOTE — ED PROVIDER NOTE - PROGRESS NOTE DETAILS
Attending note (Pedro): patient arrived at 23:21 to pediatric emergency room hallway.  Code sepsis called overhead by myself at 23:23 upon noting vitals (tachycardia, , febrile T 101.3F). Attending note (Pedro): patient ordered initial sepsis order set with iv fluids; however no ABx at this time as presentation is most consistent with a viral URI and patient is well appearing / non-toxic.  Will continue to monitor. PO challenged and tolerated. Attending note (Pedro): patient improved, reports feeling much better.  Tolerating PO.  Vitals have normalized.  Flu neg.  CXR shows no evidence of consolidation.  Labs otherwise non-contributory.  Likely viral syndrome, no concern for RPA as remains in NAD, airway patent, speaking clearly and no stridor/drooling.  Will dc, with f/u instructions to go to pcp, return if worsening pain/fever, cannot eat/drink.  Results of ED evaluation including labs d/w patient, who verbalizes understanding of ED evaluation and discharge plan including medications, follow-up instructions and return precautions.

## 2019-02-19 NOTE — ED PROVIDER NOTE - OBJECTIVE STATEMENT
Woody Davila P (DO) : 28 y/o M pt with no significant PMHx c/o fever (Tm 101F) and throat pain worse with cough since yesterday. Notes cough is productive with yellow phlegm. Took 2 Advil this morning with some relief. Notes abd pain, 3 episodes of watery diarrhea and nausea yesterday but resolved today. Pt is a . Did not get the flu shot this year. Denies recent Abx use or any other complaints. Pt doesn't have a PMD.

## 2019-02-19 NOTE — ED PROVIDER NOTE - CLINICAL SUMMARY MEDICAL DECISION MAKING FREE TEXT BOX
Woody Davila P (DO) : 28 y/o M pt with no significant PMHx presents to the ED for fever and throat pain. Came in as code sepsis. Will do sepsis workup. Likely secondary to viral URI.

## 2019-02-19 NOTE — ED PROVIDER NOTE - NSDCPRINTRESULTS_ED_ALL_ED
In the next few weeks you may receive a Press Ganey survey regarding your most recent clinic visit with us.  Please take a few moments out of your day to accurately evaluate your visit.  We strive to provide you with the best medical care and hope you are happy with our services 100% of the time.  We consider any rating lower than a 9/10 unacceptable. If you believe you would rate our clinic less than this please let us know how we can improve.  Again, thank you for your time and we look forward to your next visit.           We want to give the best care possible. If you receive a Press Ganey survey from our office, please take the time to fill out the survey and return it in the envelope provided. Your feedback helps us know how we are doing and we really appreciate it. Thank you.     Patient requests all Lab and Radiology Results on their Discharge Instructions

## 2019-02-19 NOTE — ED PROVIDER NOTE - THROAT FINDINGS
tonsillar swelling but no exudates minimal tonsillar swelling but no exudates; uvula midline, no drooling, no stridor, voice sounds normal; speaking clearly and in full sentences

## 2019-02-19 NOTE — ED PROVIDER NOTE - ATTENDING CONTRIBUTION TO CARE
28y/o M with no PMH presenting with 2 days of fever, cough, congestion and sore throat.  Abd pain yesterday; no abdominal pain today.      On Physical Exam:  General: well appearing, in NAD, speaking clearly in full sentences and without difficulty; cooperative with exam; clear voice; no stridor or drooling.  HEENT: PERRL, MMM; mild posterior pharyngeal erythema, no significant tonsillar enlargement, uvula midline, no exudates, no vesicles  Neck: no neck tenderness, no cervical LNA; no nuchal rigidity  Cardiac: tachy s1, s2; RRR; no MGR  Lungs: CTABL  Abdomen: soft nontender/nondistended  : no bladder tenderness or distension  Skin: intact, no rash  Extremities: no peripheral edema, no gross deformities  Neuro: no gross neurologic deficits     AP: 29M with fever cough congestion and sore throat; febrile and tachycardic - initially code sepsis; however, is very well appearing, unlikely sepsis, likely febrile response to viral illness.  Labs obtained, no significant leukocytosis, no renal/liver dysfunction, cxr shows no consolidation; no significant evidence to suggest strep pharyngitis; improving after medications in ED: will dc with instructions to f/u with pcp; increase fluid intake.

## 2019-02-19 NOTE — ED PROVIDER NOTE - NSFOLLOWUPINSTRUCTIONS_ED_ALL_ED_FT
- For fever, take Motrin 600mg every 6 hour AND/OR Tylenol 650mg every 4 hour. Take medications with food.  - Drink plenty of water for good hydration  - See a primary care doctor in 2 days. Call [phone] to make an appointment.   - Return to the ED with any worsening of the symptoms such as not being able to tolerate food by mouth.

## 2019-02-20 VITALS
SYSTOLIC BLOOD PRESSURE: 112 MMHG | HEART RATE: 98 BPM | TEMPERATURE: 99 F | RESPIRATION RATE: 18 BRPM | OXYGEN SATURATION: 98 % | DIASTOLIC BLOOD PRESSURE: 65 MMHG

## 2019-02-20 LAB
ALBUMIN SERPL ELPH-MCNC: 4.9 G/DL — SIGNIFICANT CHANGE UP (ref 3.3–5)
ALP SERPL-CCNC: 78 U/L — SIGNIFICANT CHANGE UP (ref 40–120)
ALT FLD-CCNC: 28 U/L — SIGNIFICANT CHANGE UP (ref 10–45)
ANION GAP SERPL CALC-SCNC: 14 MMOL/L — SIGNIFICANT CHANGE UP (ref 5–17)
APTT BLD: 32.1 SEC — SIGNIFICANT CHANGE UP (ref 27.5–36.3)
AST SERPL-CCNC: 24 U/L — SIGNIFICANT CHANGE UP (ref 10–40)
BASE EXCESS BLDV CALC-SCNC: 3.6 MMOL/L — HIGH (ref -2–2)
BASOPHILS # BLD AUTO: 0 K/UL — SIGNIFICANT CHANGE UP (ref 0–0.2)
BASOPHILS NFR BLD AUTO: 0.3 % — SIGNIFICANT CHANGE UP (ref 0–2)
BILIRUB SERPL-MCNC: 0.4 MG/DL — SIGNIFICANT CHANGE UP (ref 0.2–1.2)
BUN SERPL-MCNC: 16 MG/DL — SIGNIFICANT CHANGE UP (ref 7–23)
CA-I SERPL-SCNC: 1.18 MMOL/L — SIGNIFICANT CHANGE UP (ref 1.12–1.3)
CALCIUM SERPL-MCNC: 9.7 MG/DL — SIGNIFICANT CHANGE UP (ref 8.4–10.5)
CHLORIDE BLDV-SCNC: 102 MMOL/L — SIGNIFICANT CHANGE UP (ref 96–108)
CHLORIDE SERPL-SCNC: 99 MMOL/L — SIGNIFICANT CHANGE UP (ref 96–108)
CO2 BLDV-SCNC: 33 MMOL/L — HIGH (ref 22–30)
CO2 SERPL-SCNC: 26 MMOL/L — SIGNIFICANT CHANGE UP (ref 22–31)
CREAT SERPL-MCNC: 0.98 MG/DL — SIGNIFICANT CHANGE UP (ref 0.5–1.3)
EOSINOPHIL # BLD AUTO: 0.1 K/UL — SIGNIFICANT CHANGE UP (ref 0–0.5)
EOSINOPHIL NFR BLD AUTO: 1.2 % — SIGNIFICANT CHANGE UP (ref 0–6)
FLU A RESULT: SIGNIFICANT CHANGE UP
FLU A RESULT: SIGNIFICANT CHANGE UP
FLUAV AG NPH QL: SIGNIFICANT CHANGE UP
FLUBV AG NPH QL: SIGNIFICANT CHANGE UP
GAS PNL BLDV: 137 MMOL/L — SIGNIFICANT CHANGE UP (ref 136–145)
GAS PNL BLDV: SIGNIFICANT CHANGE UP
GAS PNL BLDV: SIGNIFICANT CHANGE UP
GLUCOSE BLDV-MCNC: 95 MG/DL — SIGNIFICANT CHANGE UP (ref 70–99)
GLUCOSE SERPL-MCNC: 102 MG/DL — HIGH (ref 70–99)
HCO3 BLDV-SCNC: 31 MMOL/L — HIGH (ref 21–29)
HCT VFR BLD CALC: 46.4 % — SIGNIFICANT CHANGE UP (ref 39–50)
HCT VFR BLDA CALC: 57 % — HIGH (ref 39–50)
HGB BLD CALC-MCNC: 18.7 G/DL — HIGH (ref 13–17)
HGB BLD-MCNC: 15.9 G/DL — SIGNIFICANT CHANGE UP (ref 13–17)
INR BLD: 1 RATIO — SIGNIFICANT CHANGE UP (ref 0.88–1.16)
LACTATE BLDV-MCNC: 2 MMOL/L — SIGNIFICANT CHANGE UP (ref 0.7–2)
LYMPHOCYTES # BLD AUTO: 1.3 K/UL — SIGNIFICANT CHANGE UP (ref 1–3.3)
LYMPHOCYTES # BLD AUTO: 13.5 % — SIGNIFICANT CHANGE UP (ref 13–44)
MCHC RBC-ENTMCNC: 31 PG — SIGNIFICANT CHANGE UP (ref 27–34)
MCHC RBC-ENTMCNC: 34.3 GM/DL — SIGNIFICANT CHANGE UP (ref 32–36)
MCV RBC AUTO: 90.2 FL — SIGNIFICANT CHANGE UP (ref 80–100)
MONOCYTES # BLD AUTO: 1 K/UL — HIGH (ref 0–0.9)
MONOCYTES NFR BLD AUTO: 10.1 % — SIGNIFICANT CHANGE UP (ref 2–14)
NEUTROPHILS # BLD AUTO: 7.2 K/UL — SIGNIFICANT CHANGE UP (ref 1.8–7.4)
NEUTROPHILS NFR BLD AUTO: 74.9 % — SIGNIFICANT CHANGE UP (ref 43–77)
PCO2 BLDV: 57 MMHG — HIGH (ref 35–50)
PH BLDV: 7.35 — SIGNIFICANT CHANGE UP (ref 7.35–7.45)
PLATELET # BLD AUTO: 133 K/UL — LOW (ref 150–400)
PO2 BLDV: 22 MMHG — LOW (ref 25–45)
POTASSIUM BLDV-SCNC: 3.7 MMOL/L — SIGNIFICANT CHANGE UP (ref 3.5–5.3)
POTASSIUM SERPL-MCNC: 3.7 MMOL/L — SIGNIFICANT CHANGE UP (ref 3.5–5.3)
POTASSIUM SERPL-SCNC: 3.7 MMOL/L — SIGNIFICANT CHANGE UP (ref 3.5–5.3)
PROT SERPL-MCNC: 8.6 G/DL — HIGH (ref 6–8.3)
PROTHROM AB SERPL-ACNC: 11.4 SEC — SIGNIFICANT CHANGE UP (ref 10–12.9)
RBC # BLD: 5.15 M/UL — SIGNIFICANT CHANGE UP (ref 4.2–5.8)
RBC # FLD: 11.8 % — SIGNIFICANT CHANGE UP (ref 10.3–14.5)
RSV RESULT: SIGNIFICANT CHANGE UP
RSV RNA RESP QL NAA+PROBE: SIGNIFICANT CHANGE UP
SAO2 % BLDV: 30 % — LOW (ref 67–88)
SODIUM SERPL-SCNC: 139 MMOL/L — SIGNIFICANT CHANGE UP (ref 135–145)
WBC # BLD: 9.7 K/UL — SIGNIFICANT CHANGE UP (ref 3.8–10.5)
WBC # FLD AUTO: 9.7 K/UL — SIGNIFICANT CHANGE UP (ref 3.8–10.5)

## 2019-02-20 PROCEDURE — 82435 ASSAY OF BLOOD CHLORIDE: CPT

## 2019-02-20 PROCEDURE — 82947 ASSAY GLUCOSE BLOOD QUANT: CPT

## 2019-02-20 PROCEDURE — 71046 X-RAY EXAM CHEST 2 VIEWS: CPT

## 2019-02-20 PROCEDURE — 85014 HEMATOCRIT: CPT

## 2019-02-20 PROCEDURE — 87040 BLOOD CULTURE FOR BACTERIA: CPT

## 2019-02-20 PROCEDURE — 84132 ASSAY OF SERUM POTASSIUM: CPT

## 2019-02-20 PROCEDURE — 82803 BLOOD GASES ANY COMBINATION: CPT

## 2019-02-20 PROCEDURE — 85027 COMPLETE CBC AUTOMATED: CPT

## 2019-02-20 PROCEDURE — 96375 TX/PRO/DX INJ NEW DRUG ADDON: CPT

## 2019-02-20 PROCEDURE — 83605 ASSAY OF LACTIC ACID: CPT

## 2019-02-20 PROCEDURE — 71046 X-RAY EXAM CHEST 2 VIEWS: CPT | Mod: 26

## 2019-02-20 PROCEDURE — 99284 EMERGENCY DEPT VISIT MOD MDM: CPT | Mod: 25

## 2019-02-20 PROCEDURE — 96374 THER/PROPH/DIAG INJ IV PUSH: CPT

## 2019-02-20 PROCEDURE — 93005 ELECTROCARDIOGRAM TRACING: CPT

## 2019-02-20 PROCEDURE — 85610 PROTHROMBIN TIME: CPT

## 2019-02-20 PROCEDURE — 87631 RESP VIRUS 3-5 TARGETS: CPT

## 2019-02-20 PROCEDURE — 80053 COMPREHEN METABOLIC PANEL: CPT

## 2019-02-20 PROCEDURE — 84295 ASSAY OF SERUM SODIUM: CPT

## 2019-02-20 PROCEDURE — 85730 THROMBOPLASTIN TIME PARTIAL: CPT

## 2019-02-20 PROCEDURE — 82330 ASSAY OF CALCIUM: CPT

## 2019-02-20 RX ORDER — DEXAMETHASONE 0.5 MG/5ML
6 ELIXIR ORAL ONCE
Qty: 0 | Refills: 0 | Status: COMPLETED | OUTPATIENT
Start: 2019-02-20 | End: 2019-02-20

## 2019-02-20 RX ORDER — SODIUM CHLORIDE 9 MG/ML
1000 INJECTION, SOLUTION INTRAVENOUS ONCE
Qty: 0 | Refills: 0 | Status: COMPLETED | OUTPATIENT
Start: 2019-02-20 | End: 2019-02-20

## 2019-02-20 RX ADMIN — SODIUM CHLORIDE 2100 MILLILITER(S): 9 INJECTION, SOLUTION INTRAVENOUS at 00:03

## 2019-02-20 RX ADMIN — Medication 6 MILLIGRAM(S): at 00:19

## 2019-02-20 RX ADMIN — Medication 15 MILLIGRAM(S): at 00:11

## 2019-02-20 RX ADMIN — Medication 975 MILLIGRAM(S): at 00:03

## 2019-02-20 RX ADMIN — SODIUM CHLORIDE 1000 MILLILITER(S): 9 INJECTION, SOLUTION INTRAVENOUS at 02:56

## 2019-02-20 RX ADMIN — Medication 975 MILLIGRAM(S): at 02:29

## 2019-02-22 LAB — GRAM STN FLD: SIGNIFICANT CHANGE UP

## 2019-02-22 NOTE — ED POST DISCHARGE NOTE - DETAILS
unable to leave message at the 2 numbers that were provided during intake. called multiple times. called 224 phone number, unable to leave VM. called [phone] emergency contact number which is not a working number. - VITA Burroughs No answer and unable to leave VM at 224 number. Unable to reach patient. Will send certified letter today. - Radha Mariee PA-C

## 2019-02-25 LAB
CULTURE RESULTS: SIGNIFICANT CHANGE UP
SPECIMEN SOURCE: SIGNIFICANT CHANGE UP

## 2019-04-04 LAB
CULTURE RESULTS: SIGNIFICANT CHANGE UP
SPECIMEN SOURCE: SIGNIFICANT CHANGE UP

## 2019-05-04 ENCOUNTER — INPATIENT (INPATIENT)
Facility: HOSPITAL | Age: 29
LOS: 0 days | Discharge: HOME | DRG: 343 | End: 2019-05-05
Attending: SURGERY | Admitting: SURGERY
Payer: MEDICAID

## 2019-05-04 VITALS
SYSTOLIC BLOOD PRESSURE: 122 MMHG | TEMPERATURE: 98 F | RESPIRATION RATE: 18 BRPM | HEIGHT: 68 IN | OXYGEN SATURATION: 100 % | HEART RATE: 91 BPM | WEIGHT: 139.99 LBS | DIASTOLIC BLOOD PRESSURE: 77 MMHG

## 2019-05-04 DIAGNOSIS — K35.80 UNSPECIFIED ACUTE APPENDICITIS: ICD-10-CM

## 2019-05-04 LAB
ALBUMIN SERPL ELPH-MCNC: 4.3 G/DL — SIGNIFICANT CHANGE UP (ref 3.3–5)
ALP SERPL-CCNC: 74 U/L — SIGNIFICANT CHANGE UP (ref 40–120)
ALT FLD-CCNC: 21 U/L — SIGNIFICANT CHANGE UP (ref 10–45)
ANION GAP SERPL CALC-SCNC: 11 MMOL/L — SIGNIFICANT CHANGE UP (ref 5–17)
APPEARANCE UR: ABNORMAL
APTT BLD: 27.7 SEC — SIGNIFICANT CHANGE UP (ref 27.5–36.3)
AST SERPL-CCNC: 21 U/L — SIGNIFICANT CHANGE UP (ref 10–40)
BACTERIA # UR AUTO: 0 — SIGNIFICANT CHANGE UP
BASOPHILS # BLD AUTO: 0 K/UL — SIGNIFICANT CHANGE UP (ref 0–0.2)
BASOPHILS NFR BLD AUTO: 0.2 % — SIGNIFICANT CHANGE UP (ref 0–2)
BILIRUB SERPL-MCNC: 0.2 MG/DL — SIGNIFICANT CHANGE UP (ref 0.2–1.2)
BILIRUB UR-MCNC: NEGATIVE — SIGNIFICANT CHANGE UP
BLD GP AB SCN SERPL QL: NEGATIVE — SIGNIFICANT CHANGE UP
BUN SERPL-MCNC: 13 MG/DL — SIGNIFICANT CHANGE UP (ref 7–23)
CALCIUM SERPL-MCNC: 9.6 MG/DL — SIGNIFICANT CHANGE UP (ref 8.4–10.5)
CHLORIDE SERPL-SCNC: 100 MMOL/L — SIGNIFICANT CHANGE UP (ref 96–108)
CO2 SERPL-SCNC: 26 MMOL/L — SIGNIFICANT CHANGE UP (ref 22–31)
COLOR SPEC: YELLOW — SIGNIFICANT CHANGE UP
CREAT SERPL-MCNC: 0.79 MG/DL — SIGNIFICANT CHANGE UP (ref 0.5–1.3)
DIFF PNL FLD: NEGATIVE — SIGNIFICANT CHANGE UP
EOSINOPHIL # BLD AUTO: 0.1 K/UL — SIGNIFICANT CHANGE UP (ref 0–0.5)
EOSINOPHIL NFR BLD AUTO: 0.4 % — SIGNIFICANT CHANGE UP (ref 0–6)
EPI CELLS # UR: 2 /HPF — SIGNIFICANT CHANGE UP
GLUCOSE SERPL-MCNC: 108 MG/DL — HIGH (ref 70–99)
GLUCOSE UR QL: NEGATIVE — SIGNIFICANT CHANGE UP
HCT VFR BLD CALC: 47.7 % — SIGNIFICANT CHANGE UP (ref 39–50)
HGB BLD-MCNC: 15.9 G/DL — SIGNIFICANT CHANGE UP (ref 13–17)
HYALINE CASTS # UR AUTO: 0 /LPF — SIGNIFICANT CHANGE UP (ref 0–2)
INR BLD: 0.98 RATIO — SIGNIFICANT CHANGE UP (ref 0.88–1.16)
KETONES UR-MCNC: NEGATIVE — SIGNIFICANT CHANGE UP
LEUKOCYTE ESTERASE UR-ACNC: NEGATIVE — SIGNIFICANT CHANGE UP
LIDOCAIN IGE QN: 14 U/L — SIGNIFICANT CHANGE UP (ref 7–60)
LYMPHOCYTES # BLD AUTO: 1.1 K/UL — SIGNIFICANT CHANGE UP (ref 1–3.3)
LYMPHOCYTES # BLD AUTO: 7.2 % — LOW (ref 13–44)
MCHC RBC-ENTMCNC: 30.6 PG — SIGNIFICANT CHANGE UP (ref 27–34)
MCHC RBC-ENTMCNC: 33.3 GM/DL — SIGNIFICANT CHANGE UP (ref 32–36)
MCV RBC AUTO: 91.9 FL — SIGNIFICANT CHANGE UP (ref 80–100)
MONOCYTES # BLD AUTO: 0.4 K/UL — SIGNIFICANT CHANGE UP (ref 0–0.9)
MONOCYTES NFR BLD AUTO: 2.9 % — SIGNIFICANT CHANGE UP (ref 2–14)
NEUTROPHILS # BLD AUTO: 13.1 K/UL — HIGH (ref 1.8–7.4)
NEUTROPHILS NFR BLD AUTO: 89.3 % — HIGH (ref 43–77)
NITRITE UR-MCNC: NEGATIVE — SIGNIFICANT CHANGE UP
PH UR: 7 — SIGNIFICANT CHANGE UP (ref 5–8)
PLATELET # BLD AUTO: 165 K/UL — SIGNIFICANT CHANGE UP (ref 150–400)
POTASSIUM SERPL-MCNC: 4.1 MMOL/L — SIGNIFICANT CHANGE UP (ref 3.5–5.3)
POTASSIUM SERPL-SCNC: 4.1 MMOL/L — SIGNIFICANT CHANGE UP (ref 3.5–5.3)
PROT SERPL-MCNC: 7.7 G/DL — SIGNIFICANT CHANGE UP (ref 6–8.3)
PROT UR-MCNC: ABNORMAL
PROTHROM AB SERPL-ACNC: 11.3 SEC — SIGNIFICANT CHANGE UP (ref 10–12.9)
RBC # BLD: 5.2 M/UL — SIGNIFICANT CHANGE UP (ref 4.2–5.8)
RBC # FLD: 11.6 % — SIGNIFICANT CHANGE UP (ref 10.3–14.5)
RBC CASTS # UR COMP ASSIST: 1 /HPF — SIGNIFICANT CHANGE UP (ref 0–4)
RH IG SCN BLD-IMP: POSITIVE — SIGNIFICANT CHANGE UP
SODIUM SERPL-SCNC: 137 MMOL/L — SIGNIFICANT CHANGE UP (ref 135–145)
SP GR SPEC: 1.02 — SIGNIFICANT CHANGE UP (ref 1.01–1.02)
UROBILINOGEN FLD QL: NEGATIVE — SIGNIFICANT CHANGE UP
WBC # BLD: 14.7 K/UL — HIGH (ref 3.8–10.5)
WBC # FLD AUTO: 14.7 K/UL — HIGH (ref 3.8–10.5)
WBC UR QL: 3 /HPF — SIGNIFICANT CHANGE UP (ref 0–5)

## 2019-05-04 PROCEDURE — 74177 CT ABD & PELVIS W/CONTRAST: CPT | Mod: 26

## 2019-05-04 PROCEDURE — 99285 EMERGENCY DEPT VISIT HI MDM: CPT

## 2019-05-04 RX ORDER — CEFOTETAN DISODIUM 1 G
1 VIAL (EA) INJECTION ONCE
Qty: 0 | Refills: 0 | Status: COMPLETED | OUTPATIENT
Start: 2019-05-04 | End: 2019-05-04

## 2019-05-04 RX ORDER — SODIUM CHLORIDE 9 MG/ML
1000 INJECTION, SOLUTION INTRAVENOUS
Qty: 0 | Refills: 0 | Status: DISCONTINUED | OUTPATIENT
Start: 2019-05-04 | End: 2019-05-05

## 2019-05-04 RX ORDER — SODIUM CHLORIDE 9 MG/ML
1000 INJECTION INTRAMUSCULAR; INTRAVENOUS; SUBCUTANEOUS ONCE
Qty: 0 | Refills: 0 | Status: COMPLETED | OUTPATIENT
Start: 2019-05-04 | End: 2019-05-04

## 2019-05-04 RX ORDER — MORPHINE SULFATE 50 MG/1
4 CAPSULE, EXTENDED RELEASE ORAL ONCE
Qty: 0 | Refills: 0 | Status: DISCONTINUED | OUTPATIENT
Start: 2019-05-04 | End: 2019-05-04

## 2019-05-04 RX ADMIN — SODIUM CHLORIDE 2000 MILLILITER(S): 9 INJECTION INTRAMUSCULAR; INTRAVENOUS; SUBCUTANEOUS at 16:28

## 2019-05-04 RX ADMIN — SODIUM CHLORIDE 125 MILLILITER(S): 9 INJECTION, SOLUTION INTRAVENOUS at 22:20

## 2019-05-04 RX ADMIN — SODIUM CHLORIDE 1000 MILLILITER(S): 9 INJECTION INTRAMUSCULAR; INTRAVENOUS; SUBCUTANEOUS at 17:15

## 2019-05-04 RX ADMIN — Medication 100 GRAM(S): at 18:31

## 2019-05-04 RX ADMIN — Medication 1 GRAM(S): at 19:15

## 2019-05-04 NOTE — ED ADULT NURSE NOTE - OBJECTIVE STATEMENT
29 year old male presents to ED c/o RLQ pain that started as mid abd pain this afternoon with no associated fever but has nausea and chills. No abd surgical history. Denies any radiation to testicles or back. Skin warm dry intact. Patient is visibly uncomfortable.

## 2019-05-04 NOTE — ED PROVIDER NOTE - CLINICAL SUMMARY MEDICAL DECISION MAKING FREE TEXT BOX
30 y/o M with no PMHx c/o RLQ abd pain since today, nausea, vomiting. Concern fro appendicitis. Abraham control, CT. Reassess. 28 y/o M with no PMHx c/o RLQ abd pain since today, nausea, vomiting. Concern fro appendicitis. Pain control, CT. Reassess.    Kathy Coffey MD - Attending Physician: Pt here with AP, initially mid-abd, now RLQ today, +n/v. No fever. Labs, CT, pain control, r/o appy

## 2019-05-04 NOTE — H&P ADULT - HISTORY OF PRESENT ILLNESS
30 yo man no PMH/PSH on truvada and isentress for prophylaxis presenting with abdominal pain beginning this afternoon. The pain was initially periumbilical then migrated to the RLQ with associated nausea, vomiting, and chills. Denies fever, diarrhea.

## 2019-05-04 NOTE — H&P ADULT - NSHPPHYSICALEXAM_GEN_ALL_CORE
Vital Signs Last 24 Hrs  T(C): 36.7 (04 May 2019 15:46), Max: 36.7 (04 May 2019 15:46)  T(F): 98.1 (04 May 2019 15:46), Max: 98.1 (04 May 2019 15:46)  HR: 91 (04 May 2019 15:46) (91 - 91)  BP: 122/77 (04 May 2019 15:46) (122/77 - 122/77)  BP(mean): --  RR: 18 (04 May 2019 15:46) (18 - 18)  SpO2: 100% (04 May 2019 15:46) (100% - 100%)      PHYSICAL EXAM  GEN: NAD, resting quietly  PULM: symmetric chest rise bilaterally, no increased WOB  CV: regular rate, peripheral pulses intact  ABD: soft, non-distended, RLQ TTP, no guarding, no peritoneal signs  EXTR: no cyanosis or edema, moving all extremities

## 2019-05-04 NOTE — H&P ADULT - ASSESSMENT
30 yo man no PMH/PSH on truvada and isentress for prophylaxis presenting with acute appendicitis.    - to OR tonight, laparoscopic appendectomy  - NPO, IVF  - received 1g cefotetan 1830  - floor after OR    Patient discussed with Dr. Melgar    ATP  [id number]

## 2019-05-04 NOTE — H&P ADULT - NSHPLABSRESULTS_GEN_ALL_CORE
CBC (05-04 @ 16:34)                              15.9                           14.7<H>  )----------------(  165        89.3<H>% Neutrophils, 7.2<L>% Lymphocytes, ANC: 13.1<H>                              47.7                  BMP (05-04 @ 16:34)             137     |  100     |  13    		Ca++ --      Ca 9.6                ---------------------------------( 108<H>		Mg --                 4.1     |  26      |  0.79  			Ph --        LFTs (05-04 @ 16:34)      TPro 7.7 / Alb 4.3 / TBili 0.2 / DBili -- / AST 21 / ALT 21 / AlkPhos 74    Coags (05-04 @ 17:52)  aPTT 27.7 / INR 0.98 / PT 11.3        IMAGING:  < from: CT Abdomen and Pelvis w/ IV Cont (05.04.19 @ 17:24) >    IMPRESSION:     Acute appendicitis without complication.    Additional mild rectal wall thickening with perirectal adenopathy.   Correlation with colonoscopy is recommended.    < end of copied text >

## 2019-05-04 NOTE — ED PROVIDER NOTE - OBJECTIVE STATEMENT
[Negative] : Neurological 28 y/o M with no PMHx c/o waxing and waning RLQ abd pain since 1pm. Previously happened two other time in the last 2 months. Pt states nausea, vomiting and chills. Denies blood in vomit, SOB, fever, diarrhea, no  pain, leg swelling. NKDA [[email or web address]] : no menses 30 y/o M with no PMHx c/o RLQ abd pain since 1pm. Previously happened two other time in the last 2 months. Today with initially mid-diffuse abd pain, now in RLQ since 1pm. Somewhat waxing and waning, but never fully resolves. Pt states nausea, vomiting and chills. Denies blood in vomit, SOB, fever, diarrhea, no  pain, leg swelling. NKDA

## 2019-05-04 NOTE — ED PROVIDER NOTE - NS_ ATTENDINGSCRIBEDETAILS _ED_A_ED_FT
Kathy Coffey MD - Attending Physician: The scribe's documentation has been prepared under my direction and personally reviewed by me in its entirety. I confirm that the note above accurately reflects all work, treatment, procedures, and medical decision making performed by me.

## 2019-05-04 NOTE — ED PROVIDER NOTE - GASTROINTESTINAL, MLM
Abdomen soft, diffused right sided tenderness, no guarding. Abdomen soft, diffused right sided tenderness, no guarding, neg rebound, +mcburney's tenderness

## 2019-05-04 NOTE — ED ADULT TRIAGE NOTE - CHIEF COMPLAINT QUOTE
Mid-abdominal pain started at 1300, moved to RLQ. With nausea, no vomiting.  No fevers, chills, diarrhea.

## 2019-05-04 NOTE — ED PROVIDER NOTE - ATTENDING CONTRIBUTION TO CARE
Kathy Coffey MD - Attending Physician: I have personally seen and examined this patient with the resident/fellow.  I have fully participated in the care of this patient. I have reviewed all pertinent clinical information, including history, physical exam, plan and the Resident/Fellow’s note and agree except as noted. See MDM

## 2019-05-05 VITALS
DIASTOLIC BLOOD PRESSURE: 58 MMHG | SYSTOLIC BLOOD PRESSURE: 108 MMHG | OXYGEN SATURATION: 99 % | HEART RATE: 79 BPM | TEMPERATURE: 97 F | RESPIRATION RATE: 16 BRPM

## 2019-05-05 DIAGNOSIS — K35.80 UNSPECIFIED ACUTE APPENDICITIS: ICD-10-CM

## 2019-05-05 LAB — RH IG SCN BLD-IMP: POSITIVE — SIGNIFICANT CHANGE UP

## 2019-05-05 PROCEDURE — 88304 TISSUE EXAM BY PATHOLOGIST: CPT | Mod: 26

## 2019-05-05 PROCEDURE — 44970 LAPAROSCOPY APPENDECTOMY: CPT

## 2019-05-05 PROCEDURE — 99222 1ST HOSP IP/OBS MODERATE 55: CPT | Mod: GC

## 2019-05-05 RX ORDER — OXYCODONE HYDROCHLORIDE 5 MG/1
1 TABLET ORAL
Qty: 18 | Refills: 0 | OUTPATIENT
Start: 2019-05-05 | End: 2019-05-07

## 2019-05-05 RX ORDER — RALTEGRAVIR 400 MG/1
400 TABLET, FILM COATED ORAL
Qty: 0 | Refills: 0 | Status: DISCONTINUED | OUTPATIENT
Start: 2019-05-05 | End: 2019-05-05

## 2019-05-05 RX ORDER — ONDANSETRON 8 MG/1
4 TABLET, FILM COATED ORAL ONCE
Qty: 0 | Refills: 0 | Status: COMPLETED | OUTPATIENT
Start: 2019-05-05 | End: 2019-05-05

## 2019-05-05 RX ORDER — HYDROMORPHONE HYDROCHLORIDE 2 MG/ML
0.5 INJECTION INTRAMUSCULAR; INTRAVENOUS; SUBCUTANEOUS
Qty: 0 | Refills: 0 | Status: DISCONTINUED | OUTPATIENT
Start: 2019-05-05 | End: 2019-05-05

## 2019-05-05 RX ORDER — SODIUM CHLORIDE 9 MG/ML
1000 INJECTION, SOLUTION INTRAVENOUS
Qty: 0 | Refills: 0 | Status: DISCONTINUED | OUTPATIENT
Start: 2019-05-05 | End: 2019-05-05

## 2019-05-05 RX ORDER — EMTRICITABINE AND TENOFOVIR DISOPROXIL FUMARATE 200; 300 MG/1; MG/1
1 TABLET, FILM COATED ORAL DAILY
Qty: 0 | Refills: 0 | Status: DISCONTINUED | OUTPATIENT
Start: 2019-05-05 | End: 2019-05-05

## 2019-05-05 RX ADMIN — EMTRICITABINE AND TENOFOVIR DISOPROXIL FUMARATE 1 TABLET(S): 200; 300 TABLET, FILM COATED ORAL at 08:52

## 2019-05-05 RX ADMIN — RALTEGRAVIR 400 MILLIGRAM(S): 400 TABLET, FILM COATED ORAL at 08:52

## 2019-05-05 RX ADMIN — ONDANSETRON 4 MILLIGRAM(S): 8 TABLET, FILM COATED ORAL at 08:52

## 2019-05-05 RX ADMIN — SODIUM CHLORIDE 100 MILLILITER(S): 9 INJECTION, SOLUTION INTRAVENOUS at 04:05

## 2019-05-05 NOTE — DISCHARGE NOTE PROVIDER - CARE PROVIDERS DIRECT ADDRESSES
,[email or web address]

## 2019-05-05 NOTE — DISCHARGE NOTE PROVIDER - HOSPITAL COURSE
29M was admitted to [facility] on 5/4/19. The patient had acute appendicitis and required a laparoscopic appendectomy to be performed in the OR. Post operative the patient was sent to the PACU, the patient was hemodynamically stable and sent to a surgical floor. The patient was pain was controlled by IV pain medications transitioned to po medications. The patient was advanced to regular diet and tolerated it well. The patient was hemodynamically stable and placed on home medications. The patient was told to follow up with Dr. Melgar in 2 weeks and had no other issues. 29M was admitted to [facility] on 5/4/19. The patient had acute appendicitis and required a laparoscopic appendectomy to be performed in the OR. Post operative the patient was sent to the PACU, the patient was hemodynamically stable and sent to a surgical floor. The patient was pain was controlled by IV pain medications transitioned to po medications. The patient was advanced to regular diet and tolerated it well. The patient was hemodynamically stable and placed on home medications. The patient was told to follow up with Dr. Melgar in 2 weeks and had no other issues.         CT abdomen pelvis 5/4: Acute appendicitis without complication.    Additional mild rectal wall thickening with perirectal adenopathy.     Correlation with colonoscopy is recommended.

## 2019-05-05 NOTE — PROGRESS NOTE ADULT - ASSESSMENT
30 yo man no PMH/PSH on truvada and isentress for prophylaxis presenting with acute appendicitis s/p lap appendectomy 5/5/19.     - Regular diet  - Pain control  - DVT ppx  - OOB  - Outpatient f/u w/ CRC    ATP  [id number]

## 2019-05-05 NOTE — DISCHARGE NOTE PROVIDER - PROVIDER TOKENS
PROVIDER:[TOKEN:[[id number]],FOLLOWUP:[2 weeks]] PROVIDER:[TOKEN:[[id number]],FOLLOWUP:[2 weeks]],PROVIDER:[TOKEN:[[id number]],FOLLOWUP:[2 weeks]]

## 2019-05-05 NOTE — PROGRESS NOTE ADULT - SUBJECTIVE AND OBJECTIVE BOX
SURGERY DAILY PROGRESS NOTE:       SUBJECTIVE/ROS: Patient examined at bedside. No acute events overnight. Tolerated sips w/o N/V. Pain well controlled.          MEDICATIONS  (STANDING):  emtricitabine 200 mG/tenofovir 300 mG (TRUVADA) 1 Tablet(s) Oral daily  lactated ringers. 1000 milliLiter(s) (100 mL/Hr) IV Continuous <Continuous>  ondansetron    Tablet 4 milliGRAM(s) Oral once  raltegravir Tablet 400 milliGRAM(s) Oral two times a day    MEDICATIONS  (PRN):  HYDROmorphone  Injectable 0.5 milliGRAM(s) IV Push every 10 minutes PRN Moderate Pain (4 - 6)      OBJECTIVE:    Vital Signs Last 24 Hrs  T(C): 36.3 (05 May 2019 07:28), Max: 37.6 (04 May 2019 22:20)  T(F): 97.3 (05 May 2019 07:28), Max: 99.7 (04 May 2019 22:20)  HR: 70 (05 May 2019 07:57) (69 - 100)  BP: 99/54 (05 May 2019 07:57) (90/55 - 122/77)  BP(mean): 73 (05 May 2019 07:57) (67 - 87)  RR: 15 (05 May 2019 07:57) (15 - 18)  SpO2: 100% (05 May 2019 07:57) (97% - 100%)        I&O's Detail    04 May 2019 07:01  -  05 May 2019 07:00  --------------------------------------------------------  IN:    lactated ringers.: 300 mL  Total IN: 300 mL    OUT:    Voided: 760 mL  Total OUT: 760 mL    Total NET: -460 mL      05 May 2019 07:01  -  05 May 2019 08:15  --------------------------------------------------------  IN:    lactated ringers.: 100 mL    Oral Fluid: 100 mL  Total IN: 200 mL    OUT:  Total OUT: 0 mL    Total NET: 200 mL          Daily Height in cm: 172.72 (05 May 2019 02:20)    Daily     LABS:                        15.9   14.7  )-----------( 165      ( 04 May 2019 16:34 )             47.7         137  |  100  |  13  ----------------------------<  108<H>  4.1   |  26  |  0.79    Ca    9.6      04 May 2019 16:34    TPro  7.7  /  Alb  4.3  /  TBili  0.2  /  DBili  x   /  AST  21  /  ALT  21  /  AlkPhos  74      PT/INR - ( 04 May 2019 17:52 )   PT: 11.3 sec;   INR: 0.98 ratio         PTT - ( 04 May 2019 17:52 )  PTT:27.7 sec  Urinalysis Basic - ( 04 May 2019 17:14 )    Color: Yellow / Appearance: Slightly Turbid / S.024 / pH: x  Gluc: x / Ketone: Negative  / Bili: Negative / Urobili: Negative   Blood: x / Protein: Trace / Nitrite: Negative   Leuk Esterase: Negative / RBC: 1 /hpf / WBC 3 /HPF   Sq Epi: x / Non Sq Epi: 2 /hpf / Bacteria: 0.0                PHYSICAL EXAM:  GEN: NAD  Pulm: unlabored breathing on RA  CV: radial pulse 2+, cap refil <2sec  Abd: soft, nontender, nondistended, port incisions CDI

## 2019-05-05 NOTE — DISCHARGE NOTE NURSING/CASE MANAGEMENT/SOCIAL WORK - NSDCDPATPORTLINK_GEN_ALL_CORE
You can access the [facility] Patient Portal, offered by [facility], by registering with the following website: [email or web address]

## 2019-05-05 NOTE — CONSULT NOTE ADULT - ASSESSMENT
Impression:  1) Rectal thickening- Could be infectious enteritis vs underdistention. Other differential diagnosis includes IBD (pt denies family history)  2) Status post lap appendectomy    Recommendations:  -Check Cdiff, GI PCR with next BM  -Outpatient GI follow up for further evaluation with colonoscopy (#[id number])  -Rest of care per primary team

## 2019-05-05 NOTE — CONSULT NOTE ADULT - SUBJECTIVE AND OBJECTIVE BOX
Chief Complaint:  Patient is a 29y old  Male who presents with a chief complaint of Acute appendicitis (05 May 2019 09:14)      HPI:    28 yo man no PMH/PSH on truvada and isentress for prophylaxis presents with lower abdominal pain that radiated to the right yesterday. Pain is 10/10, associated with nausea, vomiting and chills. Pt states that he has had intermittent chills since 2019, which will go away on its own. Pt was found to have acute appendicitis, now status post appendectomy. CT here incidentally showed mild rectal wall thickening with aneta-rectal adenopathy. Pt endorses 4 loose BMs yesterday that were non bloody. Pt states that he has diarrhea intermittently last year (longest episode lasted a week, then self resolved). Pt otherwise denies abdominal pain, melena, hematochezia, dysphagia, odynophagia, purulent discharge. Pt hasnt had colonoscopy or EGD in the past.     Allergies:  No Known Allergies      Home Medications:    Hospital Medications:  emtricitabine 200 mG/tenofovir 300 mG (TRUVADA) 1 Tablet(s) Oral daily  HYDROmorphone  Injectable 0.5 milliGRAM(s) IV Push every 10 minutes PRN  raltegravir Tablet 400 milliGRAM(s) Oral two times a day      PMHX/PSHX:  No pertinent past medical history  No significant past surgical history      Family history:      There is no family history of peptic ulcer disease, gastric cancer, colon polyps, colon cancer, celiac disease, biliary, hepatic, or pancreatic disease.  None of the female relatives have breast, uterine, or ovarian cancer.     Social History:     ROS:     General:  No wt loss, fevers, chills, night sweats, fatigue,   Eyes:  Good vision, no reported pain  ENT:  No sore throat, pain, runny nose, dysphagia  CV:  No pain, palpitations, hypo/hypertension  Resp:  No dyspnea, cough, tachypnea, wheezing  GI:See HPI   :  No pain, bleeding, incontinence, nocturia  Muscle:  No pain, weakness  Neuro:  No weakness, tingling, memory problems  Psych:  No fatigue, insomnia, mood problems, depression  Endocrine:  No polyuria, polydipsia, cold/heat intolerance  Heme:  No petechiae, ecchymosis, easy bruisability  Skin:  No rash, tattoos, scars, edema      PHYSICAL EXAM:     GENERAL:  Appears stated age, well-groomed  HEENT:  NC/AT,  conjunctivae clear and pink  CHEST:  Full & symmetric excursion, no increased effort, breath sounds clear  HEART:  Regular rhythm, S1, S2, no murmur/rub/S3/S4, no abdominal bruit, no edema  ABDOMEN:  Soft, non-tender, non-distended, laparoscopic surgical site c/d/i  EXTEREMITIES:  no cyanosis,clubbing or edema  SKIN:  No rash/erythema/ecchymoses  NEURO:  Alert, oriented, no asterixis    Vital Signs:  Vital Signs Last 24 Hrs  T(C): 36.4 (05 May 2019 08:00), Max: 37.6 (04 May 2019 22:20)  T(F): 97.5 (05 May 2019 08:00), Max: 99.7 (04 May 2019 22:20)  HR: 74 (05 May 2019 08:00) (69 - 100)  BP: 101/56 (05 May 2019 08:00) (90/55 - 122/77)  BP(mean): 75 (05 May 2019 08:00) (67 - 87)  RR: 16 (05 May 2019 08:00) (15 - 18)  SpO2: 100% (05 May 2019 08:00) (97% - 100%)  Daily Height in cm: 172.72 (05 May 2019 02:20)    Daily     LABS:                        15.9   14.7  )-----------( 165      ( 04 May 2019 16:34 )             47.7     Mean Cell Volume: 91.9 fl (-19 @ 16:34)        137  |  100  |  13  ----------------------------<  108<H>  4.1   |  26  |  0.79    Ca    9.6      04 May 2019 16:34    TPro  7.7  /  Alb  4.3  /  TBili  0.2  /  DBili  x   /  AST  21  /  ALT  21  /  AlkPhos  74      LIVER FUNCTIONS - ( 04 May 2019 16:34 )  Alb: 4.3 g/dL / Pro: 7.7 g/dL / ALK PHOS: 74 U/L / ALT: 21 U/L / AST: 21 U/L / GGT: x           PT/INR - ( 04 May 2019 17:52 )   PT: 11.3 sec;   INR: 0.98 ratio         PTT - ( 04 May 2019 17:52 )  PTT:27.7 sec  Urinalysis Basic - ( 04 May 2019 17:14 )    Color: Yellow / Appearance: Slightly Turbid / S.024 / pH: x  Gluc: x / Ketone: Negative  / Bili: Negative / Urobili: Negative   Blood: x / Protein: Trace / Nitrite: Negative   Leuk Esterase: Negative / RBC: 1 /hpf / WBC 3 /HPF   Sq Epi: x / Non Sq Epi: 2 /hpf / Bacteria: 0.0      Amylase Serum--      Lipase serum14       Ammonia--                          15.9   14.7  )-----------( 165      ( 04 May 2019 16:34 )             47.7     Imaging:      < from: CT Abdomen and Pelvis w/ IV Cont (19 @ 17:24) >  FINDINGS:    LOWER CHEST: Within normal limits.    LIVER: Within normal limits.  BILE DUCTS: Normal caliber.  GALLBLADDER: Within normal limits.  SPLEEN: Within normal limits.  PANCREAS: Within normal limits.  ADRENALS: Within normal limits.  KIDNEYS/URETERS: Within normal limits.    BLADDER: Within normal limits.  REPRODUCTIVE ORGANS: Prostate within normal limits.    BOWEL: No bowel obstruction. Appendix is dilated and fluid-filled   measuring up to 8 mm with enhancing walls and periappendiceal   inflammation consistent with acute appendicitis. No extraluminal   collection. Wall thickening of the rectum with abnormal perirectal nodes   measuring up to 1.0 x 0.8 cm (2:97). Colonoscopy is recommended for   further evaluation.   PERITONEUM: No ascites.  VESSELS:  Within normal limits.  RETROPERITONEUM: No lymphadenopathy.    ABDOMINAL WALL: Within normal limits.  BONES: Within normal limits.    IMPRESSION:     Acute appendicitis without complication.    Additional mild rectal wall thickening with perirectal adenopathy.   Correlation with colonoscopy is recommended.    Findings discussed with Dr. Hernandez by Dr. Julio on 2019 at 5:30 PM   with read back.        < end of copied text > Chief Complaint:  Patient is a 29y old  Male who presents with a chief complaint of Acute appendicitis (05 May 2019 09:14)      HPI:    28 yo man no PMH/PSH on truvada and isentress for prophylaxis presents with lower abdominal pain that radiated to the right yesterday. Pain is 10/10, associated with nausea, vomiting and chills. Pt states that he has had intermittent chills since 2019, which will go away on its own. Pt was found to have acute appendicitis, now status post appendectomy. CT here incidentally showed mild rectal wall thickening with aneta-rectal adenopathy. Pt endorses 4 loose BMs yesterday that were non bloody. Pt states that he has diarrhea intermittently last year (longest episode lasted a week, then self resolved). Pt otherwise denies abdominal pain, melena, hematochezia, dysphagia, odynophagia, purulent discharge. Pt hasnt had colonoscopy or EGD in the past.     Allergies:  No Known Allergies      Home Medications:    Hospital Medications:  emtricitabine 200 mG/tenofovir 300 mG (TRUVADA) 1 Tablet(s) Oral daily  HYDROmorphone  Injectable 0.5 milliGRAM(s) IV Push every 10 minutes PRN  raltegravir Tablet 400 milliGRAM(s) Oral two times a day      PMHX/PSHX:  No pertinent past medical history  No significant past surgical history      Family history:      There is no family history of peptic ulcer disease, gastric cancer, colon polyps, colon cancer, celiac disease, biliary, hepatic, or pancreatic disease.  None of the female relatives have breast, uterine, or ovarian cancer.     Social History: no tobacco, or EtoH    ROS:     General:  No wt loss, fevers, chills, night sweats, fatigue,   Eyes:  Good vision, no reported pain  ENT:  No sore throat, pain, runny nose, dysphagia  CV:  No pain, palpitations, hypo/hypertension  Resp:  No dyspnea, cough, tachypnea, wheezing  GI:See HPI   :  No pain, bleeding, incontinence, nocturia  Muscle:  No pain, weakness  Neuro:  No weakness, tingling, memory problems  Psych:  No fatigue, insomnia, mood problems, depression  Endocrine:  No polyuria, polydipsia, cold/heat intolerance  Heme:  No petechiae, ecchymosis, easy bruisability  Skin:  No rash, tattoos, scars, edema      PHYSICAL EXAM:     GENERAL:  Appears stated age, well-groomed  HEENT:  NC/AT,  conjunctivae clear and pink  CHEST:  Full & symmetric excursion, no increased effort, breath sounds clear  HEART:  Regular rhythm, S1, S2, no murmur/rub/S3/S4, no abdominal bruit, no edema  ABDOMEN:  Soft, non-tender, non-distended, laparoscopic surgical site c/d/i  EXTEREMITIES:  no cyanosis,clubbing or edema  SKIN:  No rash/erythema/ecchymoses  NEURO:  Alert, oriented, no asterixis    Vital Signs:  Vital Signs Last 24 Hrs  T(C): 36.4 (05 May 2019 08:00), Max: 37.6 (04 May 2019 22:20)  T(F): 97.5 (05 May 2019 08:00), Max: 99.7 (04 May 2019 22:20)  HR: 74 (05 May 2019 08:00) (69 - 100)  BP: 101/56 (05 May 2019 08:00) (90/55 - 122/77)  BP(mean): 75 (05 May 2019 08:00) (67 - 87)  RR: 16 (05 May 2019 08:00) (15 - 18)  SpO2: 100% (05 May 2019 08:00) (97% - 100%)  Daily Height in cm: 172.72 (05 May 2019 02:20)    Daily     LABS:                        15.9   14.7  )-----------( 165      ( 04 May 2019 16:34 )             47.7     Mean Cell Volume: 91.9 fl (- @ 16:34)        137  |  100  |  13  ----------------------------<  108<H>  4.1   |  26  |  0.79    Ca    9.6      04 May 2019 16:34    TPro  7.7  /  Alb  4.3  /  TBili  0.2  /  DBili  x   /  AST  21  /  ALT  21  /  AlkPhos  74      LIVER FUNCTIONS - ( 04 May 2019 16:34 )  Alb: 4.3 g/dL / Pro: 7.7 g/dL / ALK PHOS: 74 U/L / ALT: 21 U/L / AST: 21 U/L / GGT: x           PT/INR - ( 04 May 2019 17:52 )   PT: 11.3 sec;   INR: 0.98 ratio         PTT - ( 04 May 2019 17:52 )  PTT:27.7 sec  Urinalysis Basic - ( 04 May 2019 17:14 )    Color: Yellow / Appearance: Slightly Turbid / S.024 / pH: x  Gluc: x / Ketone: Negative  / Bili: Negative / Urobili: Negative   Blood: x / Protein: Trace / Nitrite: Negative   Leuk Esterase: Negative / RBC: 1 /hpf / WBC 3 /HPF   Sq Epi: x / Non Sq Epi: 2 /hpf / Bacteria: 0.0      Amylase Serum--      Lipase serum14       Ammonia--                          15.9   14.7  )-----------( 165      ( 04 May 2019 16:34 )             47.7     Imaging:      < from: CT Abdomen and Pelvis w/ IV Cont (19 @ 17:24) >  FINDINGS:    LOWER CHEST: Within normal limits.    LIVER: Within normal limits.  BILE DUCTS: Normal caliber.  GALLBLADDER: Within normal limits.  SPLEEN: Within normal limits.  PANCREAS: Within normal limits.  ADRENALS: Within normal limits.  KIDNEYS/URETERS: Within normal limits.    BLADDER: Within normal limits.  REPRODUCTIVE ORGANS: Prostate within normal limits.    BOWEL: No bowel obstruction. Appendix is dilated and fluid-filled   measuring up to 8 mm with enhancing walls and periappendiceal   inflammation consistent with acute appendicitis. No extraluminal   collection. Wall thickening of the rectum with abnormal perirectal nodes   measuring up to 1.0 x 0.8 cm (2:97). Colonoscopy is recommended for   further evaluation.   PERITONEUM: No ascites.  VESSELS:  Within normal limits.  RETROPERITONEUM: No lymphadenopathy.    ABDOMINAL WALL: Within normal limits.  BONES: Within normal limits.    IMPRESSION:     Acute appendicitis without complication.    Additional mild rectal wall thickening with perirectal adenopathy.   Correlation with colonoscopy is recommended.    Findings discussed with Dr. Hernandez by Dr. Julio on 2019 at 5:30 PM   with read back.        < end of copied text >

## 2019-05-05 NOTE — DISCHARGE NOTE PROVIDER - CARE PROVIDER_API CALL
Cole Melgar)  Surgery; Surgical Critical Care  [address] [place] [address]  Phone: [phone]  Fax: [phone]  Follow Up Time: 2 weeks Cole Melgar)  Surgery; Surgical Critical Care  [address] [place] [address]  Phone: [phone]  Fax: [phone]  Follow Up Time: 2 weeks    Henrik Blackwell)  ColonRectal Surgery; Surgery  [address] [place] [address], [address]  [place] [address]  Phone: [phone]  Fax: [phone]  Follow Up Time: 2 weeks

## 2019-05-05 NOTE — CONSULT NOTE ADULT - ATTENDING COMMENTS
Pt will need colonoscopy as outpatient after he has recovered from surgery to exclude any underlying IBD of the large bowel

## 2019-05-05 NOTE — DISCHARGE NOTE PROVIDER - NSDCFUADDINST_GEN_ALL_CORE_FT
Do not drive while taking oxycodone  Allow water to run over incisions in the shower. do not scrub incisions Do not drive while taking oxycodone  Allow water to run over incisions in the shower. do not scrub incisions    Please follow up with your surgeon, Dr Melgar, in the office in 2 weeks.     Please follow up with a gastroenterologist or colorectal surgeon as an outpatient for a colonoscopy (mild rectal wall thickening seen on CT scan). You may call a colorectal surgeon at [phone] to make an appointment.

## 2019-05-14 LAB — SURGICAL PATHOLOGY STUDY: SIGNIFICANT CHANGE UP

## 2019-07-10 PROCEDURE — 74177 CT ABD & PELVIS W/CONTRAST: CPT

## 2019-07-10 PROCEDURE — 81001 URINALYSIS AUTO W/SCOPE: CPT

## 2019-07-10 PROCEDURE — 80053 COMPREHEN METABOLIC PANEL: CPT

## 2019-07-10 PROCEDURE — 86850 RBC ANTIBODY SCREEN: CPT

## 2019-07-10 PROCEDURE — 86901 BLOOD TYPING SEROLOGIC RH(D): CPT

## 2019-07-10 PROCEDURE — 83690 ASSAY OF LIPASE: CPT

## 2019-07-10 PROCEDURE — 96361 HYDRATE IV INFUSION ADD-ON: CPT

## 2019-07-10 PROCEDURE — 85610 PROTHROMBIN TIME: CPT

## 2019-07-10 PROCEDURE — 88304 TISSUE EXAM BY PATHOLOGIST: CPT

## 2019-07-10 PROCEDURE — 86900 BLOOD TYPING SEROLOGIC ABO: CPT

## 2019-07-10 PROCEDURE — 96374 THER/PROPH/DIAG INJ IV PUSH: CPT | Mod: XU

## 2019-07-10 PROCEDURE — 85730 THROMBOPLASTIN TIME PARTIAL: CPT

## 2019-07-10 PROCEDURE — 99285 EMERGENCY DEPT VISIT HI MDM: CPT | Mod: 25

## 2019-07-10 PROCEDURE — C1889: CPT

## 2019-07-10 PROCEDURE — 85027 COMPLETE CBC AUTOMATED: CPT

## 2019-09-30 NOTE — ED PROVIDER NOTE - CARE PLAN
present and adequate/some missing dentition, however, functional
Principal Discharge DX:	Pharyngitis

## 2021-02-25 NOTE — ED PROVIDER NOTE - NS ED ATTENDING STATEMENT MOD
[facility]
I have personally seen and examined this patient.  I have fully participated in the care of this patient. I have reviewed all pertinent clinical information, including history, physical exam, plan and the Resident’s note and agree except as noted.

## 2022-06-12 NOTE — ED ADULT NURSE NOTE - NSFALLRSKASSESSTYPE_ED_ALL_ED
Patient : Dawson Morton Age: 14 year old Sex: male   MRN: [id number] Encounter Date: 6/12/2022    History     Chief Complaint   Patient presents with   • Cough     cold symptoms, rib pain. Mom concerned for pneumonia.      Patient is 14 year old male with a past medical history significant for asthma and seasonal allergies who presents to the ED for evaluation of a cough x2 days and rib pain.     Patient states that he recently had flu a month ago followed by COVID with complete resolution of symptoms. Began having a productive cough with yellow phlegm that was worse at night; mother became concerned because patient has h/o previous pneumonia many years ago and came in with request for CXR. Patient states he experienced some SOB yesterday that has since completely resolved. Was able to participate in activities such as going to the zoo and did not have any change or difficulty in eating/drinking. Denies associated fevers, chills, chest pain, abdominal pain, changes in urinary or bowel habits.     In addition, patient is complaining of right rib pain that started during baseball practice two days ago and has persisted - feels achey. Describes the pain as a 7/10. Not associated with any trauma. Has not tried Tylenol or Ibuprofen as has some pill aversion.     Allergies   Allergen Reactions   • Eggs Or Egg-Derived Products   (Food Or Med) HIVES     There are no discharge medications for this patient.    No past medical history on file.    No past surgical history on file.    No family history on file.    Social History     Tobacco Use   • Smoking status: Not on file   • Smokeless tobacco: Not on file   Substance Use Topics   • Alcohol use: Not on file   • Drug use: Not on file     Review of Systems as stated above.     Physical Exam     ED Triage Vitals   ED Triage Vitals Group      Temp 06/12/22 0913 98.6 °F (37 °C)      Heart Rate 06/12/22 0913 103      Resp 06/12/22 0913 20      BP 06/12/22 0916 117/67      SpO2 06/12/22  0913 98 %      EtCO2 mmHg --       Height --       Weight 06/12/22 0913 97 lb (44 kg)      Weight Scale Used --       BMI (Calculated) --       IBW/kg (Calculated) --      Physical Exam  Vitals reviewed.   Constitutional:       General: He is active. He is not in acute distress.     Appearance: He is not toxic-appearing.      Comments: appears very comfortable   HENT:      Nose: Congestion present.      Mouth/Throat:      Pharynx: Posterior oropharyngeal erythema present. No oropharyngeal exudate.   Cardiovascular:      Rate and Rhythm: Normal rate and regular rhythm.      Pulses: Normal pulses.      Heart sounds: Normal heart sounds. No murmur heard.    No friction rub. No gallop.   Pulmonary:      Effort: Pulmonary effort is normal. No respiratory distress or nasal flaring.      Breath sounds: Normal breath sounds. No stridor. No wheezing or rhonchi.   Abdominal:      General: Abdomen is flat. Bowel sounds are normal. There is no distension.      Palpations: Abdomen is soft.      Tenderness: There is no abdominal tenderness. There is no guarding or rebound.   Musculoskeletal:      Comments: Reproducible pain and TTP over intercostal between 9th and 10th rib, no overlying skin changes    Skin:     General: Skin is warm and dry.      Capillary Refill: Capillary refill takes less than 2 seconds.   Neurological:      Mental Status: He is alert.       ED Course     Procedures; None.     Lab Results     No results found for this visit on 06/12/22.    EKG Results     ECG is NSR with rate of 73 bpm. No ST changes or signs of acute ischemic change.     Radiology Results     Imaging Results          XR CHEST PA AND LATERAL 2 VIEWS (Final result)  Result time 06/12/22 10:04:25    Final result                 Impression:    No abnormality.    Electronically Signed by: PAULETTE GOLDBERG M.D.   Signed on: 6/12/2022 10:04 AM                Narrative:    EXAM/TECHNIQUE:XR CHEST PA AND LATERAL 2 VIEWS.    CLINICAL INDICATION:  Cough.    COMPARISON: 12/30/2019.    FINDINGS: Heart size is normal.  Superior mediastinum is not widened.   Lungs are clear.                              ED Medication Orders (From admission, onward)    None        Presenting cough likely secondary to Viral URI versus Allergic Rhinitis with associated Post-Nasal Drip, especially since cough is exacerbated at night. ECG obtained due to history of SOB (now resolved) that was unremarkable. CXR unremarkable. Will continue to recommend symptomatic treatment at home and provided precautions to return to ED for evaluation.     Right side pain likely 2/2 costochondritis - recommended Tylenol/NSAID treatment at home.     Plan reviewed with patient and his mother who voiced understanding and agreement to plan above.     Clinical Impression     ED Diagnosis   1. Viral URI with cough     2. Costochondritis, acute         Disposition        Discharge 6/12/2022 12:12 PM  Dawson Phee discharge to home/self care.                   Karyna Mc DO  Resident  06/12/22 [id number]     Initial (On Arrival)

## 2023-09-24 ENCOUNTER — EMERGENCY (EMERGENCY)
Facility: HOSPITAL | Age: 33
LOS: 1 days | Discharge: HOME | End: 2023-09-24
Attending: EMERGENCY MEDICINE
Payer: OTHER GOVERNMENT

## 2023-09-24 VITALS
HEART RATE: 69 BPM | RESPIRATION RATE: 17 BRPM | TEMPERATURE: 98 F | HEIGHT: 69 IN | OXYGEN SATURATION: 97 % | DIASTOLIC BLOOD PRESSURE: 86 MMHG | SYSTOLIC BLOOD PRESSURE: 142 MMHG | WEIGHT: 169.98 LBS

## 2023-09-24 PROCEDURE — 99284 EMERGENCY DEPT VISIT MOD MDM: CPT

## 2023-09-24 PROCEDURE — 74176 CT ABD & PELVIS W/O CONTRAST: CPT | Mod: 26,MA

## 2023-09-24 RX ORDER — METHOCARBAMOL 500 MG/1
1500 TABLET, FILM COATED ORAL ONCE
Refills: 0 | Status: COMPLETED | OUTPATIENT
Start: 2023-09-24 | End: 2023-09-24

## 2023-09-24 RX ORDER — KETOROLAC TROMETHAMINE 30 MG/ML
30 SYRINGE (ML) INJECTION ONCE
Refills: 0 | Status: DISCONTINUED | OUTPATIENT
Start: 2023-09-24 | End: 2023-09-24

## 2023-09-24 RX ADMIN — METHOCARBAMOL 1500 MILLIGRAM(S): 500 TABLET, FILM COATED ORAL at 22:29

## 2023-09-24 RX ADMIN — Medication 30 MILLIGRAM(S): at 22:28

## 2023-09-24 NOTE — ED ADULT NURSE NOTE - ED STAT RN HANDOFF DETAILS
Awaiting for dispo. Report given to RAND Espino for continuity of plan of care. Patient in stable condition.

## 2023-09-24 NOTE — ED PROVIDER NOTE - OBJECTIVE STATEMENT
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  Denies trauma.  No urinary or bowel incontinence or retention. No motor weakness.   No visible hematuria or dysuria.

## 2023-09-24 NOTE — ED PROVIDER NOTE - PATIENT PORTAL LINK FT
You can access the FollowMyHealth Patient Portal offered by [facility] by registering at the following website: [email or web address]. By joining [facility]’s FollowMyHealth portal, you will also be able to view your health information using other applications (apps) compatible with our system.

## 2023-09-24 NOTE — ED PROVIDER NOTE - CLINICAL SUMMARY MEDICAL DECISION MAKING FREE TEXT BOX
Low back pain vs uropathy, f/u CT.   Provide analgesia. Low back pain vs uropathy, f/u CT.   Provide analgesia.  CT reported: Mild left hydroureteronephrosis secondary to 5 mm intravesicular stone at the left ureterovesicular junction.  Will provide IVF and check renal function.  Pt in no distress. Low back pain vs uropathy, f/u CT.   Provide analgesia.  CT reported: Mild left hydroureteronephrosis secondary to 5 mm intravesicular stone at the left ureterovesicular junction.  Will provide IVF and check renal function.  Pt in no distress.  440a- renal fx normal. Will Rx Flomax and IBU.  Pt is well appearing, has no new complaints and able to walk with normal gait. Pt is stable for discharge and follow up with medical doctor. Pt educated on care and need for follow up. Discussed anticipatory guidance and return precautions. Questions answered. I had a detailed discussion with the patient regarding the historical points, exam findings, and any diagnostic results supporting the discharge diagnosis.

## 2023-09-24 NOTE — ED ADULT TRIAGE NOTE - CHIEF COMPLAINT QUOTE
Lower back pain while at work today ,similar pain in July of this year was seen in [facility] with no abnormal findings noted ,denied trauma

## 2023-09-24 NOTE — ED PROVIDER NOTE - NSFOLLOWUPCLINICS_GEN_ALL_ED_FT
[facility]  Urology  [address] [place] [address]  Phone: [phone]  Fax: [phone]

## 2023-09-24 NOTE — ED ADULT NURSE NOTE - CHIEF COMPLAINT QUOTE
Lower back pain while at work today ,similar pain in July of this year was seen in [place] [facility] with no abnormal findings noted ,denied trauma

## 2023-09-24 NOTE — ED PROVIDER NOTE - PHYSICAL EXAMINATION
No distress  Back: left lumbar paraspinal tenderness and tenseness.   No saddle anesthesia, B/L knee, pedal and EHL flex and ext intact, normal gait, no foot drop. Truncal flexion and extension intact.   B/L straight leg test to 90 degrees

## 2023-09-25 VITALS
SYSTOLIC BLOOD PRESSURE: 113 MMHG | DIASTOLIC BLOOD PRESSURE: 73 MMHG | OXYGEN SATURATION: 98 % | RESPIRATION RATE: 18 BRPM | HEART RATE: 76 BPM | TEMPERATURE: 98 F

## 2023-09-25 LAB
ANION GAP SERPL CALC-SCNC: 8 MMOL/L — SIGNIFICANT CHANGE UP (ref 5–17)
APPEARANCE UR: ABNORMAL
BASOPHILS # BLD AUTO: 0.04 K/UL — SIGNIFICANT CHANGE UP (ref 0–0.2)
BASOPHILS NFR BLD AUTO: 0.4 % — SIGNIFICANT CHANGE UP (ref 0–2)
BILIRUB UR-MCNC: NEGATIVE — SIGNIFICANT CHANGE UP
BUN SERPL-MCNC: 15 MG/DL — SIGNIFICANT CHANGE UP (ref 7–18)
CALCIUM SERPL-MCNC: 9.4 MG/DL — SIGNIFICANT CHANGE UP (ref 8.4–10.5)
CHLORIDE SERPL-SCNC: 106 MMOL/L — SIGNIFICANT CHANGE UP (ref 96–108)
CO2 SERPL-SCNC: 26 MMOL/L — SIGNIFICANT CHANGE UP (ref 22–31)
COLOR SPEC: YELLOW — SIGNIFICANT CHANGE UP
CREAT SERPL-MCNC: 1.19 MG/DL — SIGNIFICANT CHANGE UP (ref 0.5–1.3)
DIFF PNL FLD: ABNORMAL
EGFR: 84 ML/MIN/1.73M2 — SIGNIFICANT CHANGE UP
EOSINOPHIL # BLD AUTO: 0.01 K/UL — SIGNIFICANT CHANGE UP (ref 0–0.5)
EOSINOPHIL NFR BLD AUTO: 0.1 % — SIGNIFICANT CHANGE UP (ref 0–6)
GLUCOSE SERPL-MCNC: 141 MG/DL — HIGH (ref 70–99)
GLUCOSE UR QL: NEGATIVE MG/DL — SIGNIFICANT CHANGE UP
HCT VFR BLD CALC: 44.7 % — SIGNIFICANT CHANGE UP (ref 39–50)
HGB BLD-MCNC: 15 G/DL — SIGNIFICANT CHANGE UP (ref 13–17)
IMM GRANULOCYTES NFR BLD AUTO: 0.6 % — SIGNIFICANT CHANGE UP (ref 0–0.9)
KETONES UR-MCNC: NEGATIVE MG/DL — SIGNIFICANT CHANGE UP
LEUKOCYTE ESTERASE UR-ACNC: ABNORMAL
LYMPHOCYTES # BLD AUTO: 1.28 K/UL — SIGNIFICANT CHANGE UP (ref 1–3.3)
LYMPHOCYTES # BLD AUTO: 12.5 % — LOW (ref 13–44)
MCHC RBC-ENTMCNC: 30.6 PG — SIGNIFICANT CHANGE UP (ref 27–34)
MCHC RBC-ENTMCNC: 33.6 GM/DL — SIGNIFICANT CHANGE UP (ref 32–36)
MCV RBC AUTO: 91.2 FL — SIGNIFICANT CHANGE UP (ref 80–100)
MONOCYTES # BLD AUTO: 0.35 K/UL — SIGNIFICANT CHANGE UP (ref 0–0.9)
MONOCYTES NFR BLD AUTO: 3.4 % — SIGNIFICANT CHANGE UP (ref 2–14)
NEUTROPHILS # BLD AUTO: 8.46 K/UL — HIGH (ref 1.8–7.4)
NEUTROPHILS NFR BLD AUTO: 83 % — HIGH (ref 43–77)
NITRITE UR-MCNC: NEGATIVE — SIGNIFICANT CHANGE UP
NRBC # BLD: 0 /100 WBCS — SIGNIFICANT CHANGE UP (ref 0–0)
PH UR: 6.5 — SIGNIFICANT CHANGE UP (ref 5–8)
PLATELET # BLD AUTO: 168 K/UL — SIGNIFICANT CHANGE UP (ref 150–400)
POTASSIUM SERPL-MCNC: 3.7 MMOL/L — SIGNIFICANT CHANGE UP (ref 3.5–5.3)
POTASSIUM SERPL-SCNC: 3.7 MMOL/L — SIGNIFICANT CHANGE UP (ref 3.5–5.3)
PROT UR-MCNC: NEGATIVE MG/DL — SIGNIFICANT CHANGE UP
RBC # BLD: 4.9 M/UL — SIGNIFICANT CHANGE UP (ref 4.2–5.8)
RBC # FLD: 12.2 % — SIGNIFICANT CHANGE UP (ref 10.3–14.5)
SODIUM SERPL-SCNC: 140 MMOL/L — SIGNIFICANT CHANGE UP (ref 135–145)
SP GR SPEC: 1.02 — SIGNIFICANT CHANGE UP (ref 1–1.03)
UROBILINOGEN FLD QL: 1 MG/DL — SIGNIFICANT CHANGE UP (ref 0.2–1)
WBC # BLD: 10.2 K/UL — SIGNIFICANT CHANGE UP (ref 3.8–10.5)
WBC # FLD AUTO: 10.2 K/UL — SIGNIFICANT CHANGE UP (ref 3.8–10.5)

## 2023-09-25 PROCEDURE — 36415 COLL VENOUS BLD VENIPUNCTURE: CPT

## 2023-09-25 PROCEDURE — 74176 CT ABD & PELVIS W/O CONTRAST: CPT | Mod: MA

## 2023-09-25 PROCEDURE — 96374 THER/PROPH/DIAG INJ IV PUSH: CPT

## 2023-09-25 PROCEDURE — 80048 BASIC METABOLIC PNL TOTAL CA: CPT

## 2023-09-25 PROCEDURE — 99284 EMERGENCY DEPT VISIT MOD MDM: CPT | Mod: 25

## 2023-09-25 PROCEDURE — 81001 URINALYSIS AUTO W/SCOPE: CPT

## 2023-09-25 PROCEDURE — 85025 COMPLETE CBC W/AUTO DIFF WBC: CPT

## 2023-09-25 RX ORDER — TAMSULOSIN HYDROCHLORIDE 0.4 MG/1
1 CAPSULE ORAL
Qty: 7 | Refills: 0
Start: 2023-09-25 | End: 2023-10-01

## 2023-09-25 RX ORDER — SODIUM CHLORIDE 9 MG/ML
1000 INJECTION INTRAMUSCULAR; INTRAVENOUS; SUBCUTANEOUS ONCE
Refills: 0 | Status: COMPLETED | OUTPATIENT
Start: 2023-09-25 | End: 2023-09-25

## 2023-09-25 RX ORDER — IBUPROFEN 200 MG
1 TABLET ORAL
Qty: 20 | Refills: 0
Start: 2023-09-25

## 2023-09-25 RX ADMIN — SODIUM CHLORIDE 1000 MILLILITER(S): 9 INJECTION INTRAMUSCULAR; INTRAVENOUS; SUBCUTANEOUS at 01:28

## 2024-06-25 NOTE — ED PROVIDER NOTE - EKG ADDITIONAL QUESTION - PERFORMED INDEPENDENT VISUALIZATION
Geremias has office visit scheduled for 7/10/24 but has not carried out labs as recommended at January visit,   This included cortrosyn stimulation test, and other labs off baseline.   All labs have .    Yes

## 2024-12-30 NOTE — ED ADULT NURSE REASSESSMENT NOTE - NS ED NURSE REASSESS COMMENT FT1
Pt able to tolerate PO intake. Says he feels "much better."
Pt says his pain has improved. States "I feel better." Pt reminded to give urine for UA and culture.
No